# Patient Record
Sex: FEMALE | Race: WHITE | HISPANIC OR LATINO | Employment: PART TIME | URBAN - METROPOLITAN AREA
[De-identification: names, ages, dates, MRNs, and addresses within clinical notes are randomized per-mention and may not be internally consistent; named-entity substitution may affect disease eponyms.]

---

## 2017-01-19 ENCOUNTER — HOSPITAL ENCOUNTER (OUTPATIENT)
Dept: RADIOLOGY | Facility: MEDICAL CENTER | Age: 42
End: 2017-01-19
Attending: INTERNAL MEDICINE
Payer: COMMERCIAL

## 2017-01-19 DIAGNOSIS — M54.2 CERVICALGIA: ICD-10-CM

## 2017-01-19 DIAGNOSIS — M54.5 LOW BACK PAIN, UNSPECIFIED BACK PAIN LATERALITY, UNSPECIFIED CHRONICITY, WITH SCIATICA PRESENCE UNSPECIFIED: ICD-10-CM

## 2017-01-19 PROCEDURE — 72141 MRI NECK SPINE W/O DYE: CPT

## 2017-01-19 PROCEDURE — 72148 MRI LUMBAR SPINE W/O DYE: CPT

## 2017-03-16 ENCOUNTER — NON-PROVIDER VISIT (OUTPATIENT)
Dept: WOUND CARE | Facility: MEDICAL CENTER | Age: 42
End: 2017-03-16
Attending: NURSE PRACTITIONER
Payer: COMMERCIAL

## 2017-03-16 NOTE — WOUND TEAM
Pt arrived for scheduled evaluation appointment; however, referral is for physical therapy, not wound care.  Pt's referral to be forwarded to OP PT

## 2017-04-03 ENCOUNTER — HOSPITAL ENCOUNTER (OUTPATIENT)
Dept: PHYSICAL THERAPY | Facility: REHABILITATION | Age: 42
End: 2017-04-03
Attending: NURSE PRACTITIONER
Payer: COMMERCIAL

## 2017-04-03 PROCEDURE — 97014 ELECTRIC STIMULATION THERAPY: CPT

## 2017-04-03 PROCEDURE — 97163 PT EVAL HIGH COMPLEX 45 MIN: CPT

## 2017-04-06 ENCOUNTER — APPOINTMENT (OUTPATIENT)
Dept: PHYSICAL THERAPY | Facility: REHABILITATION | Age: 42
End: 2017-04-06
Attending: NURSE PRACTITIONER
Payer: COMMERCIAL

## 2017-04-10 ENCOUNTER — HOSPITAL ENCOUNTER (OUTPATIENT)
Dept: PHYSICAL THERAPY | Facility: REHABILITATION | Age: 42
End: 2017-04-10
Attending: NURSE PRACTITIONER
Payer: COMMERCIAL

## 2017-04-10 PROCEDURE — 97140 MANUAL THERAPY 1/> REGIONS: CPT

## 2017-04-10 PROCEDURE — 97012 MECHANICAL TRACTION THERAPY: CPT

## 2017-04-10 PROCEDURE — 97110 THERAPEUTIC EXERCISES: CPT

## 2017-04-11 ENCOUNTER — APPOINTMENT (OUTPATIENT)
Dept: PHYSICAL THERAPY | Facility: REHABILITATION | Age: 42
End: 2017-04-11
Attending: NURSE PRACTITIONER
Payer: COMMERCIAL

## 2017-04-13 ENCOUNTER — APPOINTMENT (OUTPATIENT)
Dept: PHYSICAL THERAPY | Facility: REHABILITATION | Age: 42
End: 2017-04-13
Attending: NURSE PRACTITIONER
Payer: COMMERCIAL

## 2017-04-18 ENCOUNTER — APPOINTMENT (OUTPATIENT)
Dept: PHYSICAL THERAPY | Facility: REHABILITATION | Age: 42
End: 2017-04-18
Attending: NURSE PRACTITIONER
Payer: COMMERCIAL

## 2017-04-20 ENCOUNTER — APPOINTMENT (OUTPATIENT)
Dept: PHYSICAL THERAPY | Facility: REHABILITATION | Age: 42
End: 2017-04-20
Attending: NURSE PRACTITIONER
Payer: COMMERCIAL

## 2017-04-24 ENCOUNTER — HOSPITAL ENCOUNTER (OUTPATIENT)
Dept: PHYSICAL THERAPY | Facility: REHABILITATION | Age: 42
End: 2017-04-24
Attending: NURSE PRACTITIONER
Payer: COMMERCIAL

## 2017-04-24 PROCEDURE — 97140 MANUAL THERAPY 1/> REGIONS: CPT

## 2017-04-24 PROCEDURE — 97012 MECHANICAL TRACTION THERAPY: CPT

## 2017-04-24 PROCEDURE — 97110 THERAPEUTIC EXERCISES: CPT

## 2017-04-27 ENCOUNTER — APPOINTMENT (OUTPATIENT)
Dept: PHYSICAL THERAPY | Facility: REHABILITATION | Age: 42
End: 2017-04-27
Attending: NURSE PRACTITIONER
Payer: COMMERCIAL

## 2017-05-01 ENCOUNTER — HOSPITAL ENCOUNTER (OUTPATIENT)
Dept: PHYSICAL THERAPY | Facility: REHABILITATION | Age: 42
End: 2017-05-01
Attending: NURSE PRACTITIONER
Payer: COMMERCIAL

## 2017-05-01 PROCEDURE — 97112 NEUROMUSCULAR REEDUCATION: CPT

## 2017-05-01 PROCEDURE — 97014 ELECTRIC STIMULATION THERAPY: CPT

## 2017-05-01 PROCEDURE — 97110 THERAPEUTIC EXERCISES: CPT

## 2017-05-03 ENCOUNTER — HOSPITAL ENCOUNTER (OUTPATIENT)
Dept: RADIOLOGY | Facility: MEDICAL CENTER | Age: 42
End: 2017-05-03
Attending: NEUROLOGICAL SURGERY | Admitting: NEUROLOGICAL SURGERY

## 2017-05-03 PROCEDURE — 71020 DX-CHEST-2 VIEWS: CPT

## 2017-05-03 RX ORDER — METHOCARBAMOL 750 MG/1
750 TABLET, FILM COATED ORAL 4 TIMES DAILY
Status: ON HOLD | COMMUNITY
End: 2017-05-11

## 2017-05-10 ENCOUNTER — APPOINTMENT (OUTPATIENT)
Dept: RADIOLOGY | Facility: MEDICAL CENTER | Age: 42
End: 2017-05-10
Attending: NEUROLOGICAL SURGERY
Payer: COMMERCIAL

## 2017-05-10 ENCOUNTER — HOSPITAL ENCOUNTER (OUTPATIENT)
Facility: MEDICAL CENTER | Age: 42
End: 2017-05-11
Attending: NEUROLOGICAL SURGERY | Admitting: NEUROLOGICAL SURGERY
Payer: COMMERCIAL

## 2017-05-10 PROBLEM — M47.12 CERVICAL SPONDYLOSIS WITH MYELOPATHY: Status: ACTIVE | Noted: 2017-05-10

## 2017-05-10 LAB
HCG UR QL: NEGATIVE
SP GR UR REFRACTOMETRY: 1.01

## 2017-05-10 PROCEDURE — 700102 HCHG RX REV CODE 250 W/ 637 OVERRIDE(OP): Performed by: NURSE PRACTITIONER

## 2017-05-10 PROCEDURE — 502000 HCHG MISC OR IMPLANTS RC 0278: Performed by: NEUROLOGICAL SURGERY

## 2017-05-10 PROCEDURE — 700102 HCHG RX REV CODE 250 W/ 637 OVERRIDE(OP)

## 2017-05-10 PROCEDURE — 96374 THER/PROPH/DIAG INJ IV PUSH: CPT

## 2017-05-10 PROCEDURE — A6402 STERILE GAUZE <= 16 SQ IN: HCPCS | Performed by: NEUROLOGICAL SURGERY

## 2017-05-10 PROCEDURE — 502240 HCHG MISC OR SUPPLY RC 0272: Performed by: NEUROLOGICAL SURGERY

## 2017-05-10 PROCEDURE — 700111 HCHG RX REV CODE 636 W/ 250 OVERRIDE (IP)

## 2017-05-10 PROCEDURE — 160009 HCHG ANES TIME/MIN: Performed by: NEUROLOGICAL SURGERY

## 2017-05-10 PROCEDURE — 72020 X-RAY EXAM OF SPINE 1 VIEW: CPT

## 2017-05-10 PROCEDURE — 700111 HCHG RX REV CODE 636 W/ 250 OVERRIDE (IP): Performed by: NURSE PRACTITIONER

## 2017-05-10 PROCEDURE — C1713 ANCHOR/SCREW BN/BN,TIS/BN: HCPCS | Performed by: NEUROLOGICAL SURGERY

## 2017-05-10 PROCEDURE — 160035 HCHG PACU - 1ST 60 MINS PHASE I: Performed by: NEUROLOGICAL SURGERY

## 2017-05-10 PROCEDURE — 81025 URINE PREGNANCY TEST: CPT

## 2017-05-10 PROCEDURE — 502626 HCHG SURGIFLO HEMOSTATIC MATRIX 6ML: Performed by: NEUROLOGICAL SURGERY

## 2017-05-10 PROCEDURE — G0378 HOSPITAL OBSERVATION PER HR: HCPCS

## 2017-05-10 PROCEDURE — 160036 HCHG PACU - EA ADDL 30 MINS PHASE I: Performed by: NEUROLOGICAL SURGERY

## 2017-05-10 PROCEDURE — 160029 HCHG SURGERY MINUTES - 1ST 30 MINS LEVEL 4: Performed by: NEUROLOGICAL SURGERY

## 2017-05-10 PROCEDURE — A9270 NON-COVERED ITEM OR SERVICE: HCPCS

## 2017-05-10 PROCEDURE — 700101 HCHG RX REV CODE 250

## 2017-05-10 PROCEDURE — 500367 HCHG DRAIN KIT, HEMOVAC: Performed by: NEUROLOGICAL SURGERY

## 2017-05-10 PROCEDURE — 501838 HCHG SUTURE GENERAL: Performed by: NEUROLOGICAL SURGERY

## 2017-05-10 PROCEDURE — A9270 NON-COVERED ITEM OR SERVICE: HCPCS | Performed by: NURSE PRACTITIONER

## 2017-05-10 PROCEDURE — 160048 HCHG OR STATISTICAL LEVEL 1-5: Performed by: NEUROLOGICAL SURGERY

## 2017-05-10 PROCEDURE — 500122 HCHG BOVIE, BLADE: Performed by: NEUROLOGICAL SURGERY

## 2017-05-10 PROCEDURE — 160002 HCHG RECOVERY MINUTES (STAT): Performed by: NEUROLOGICAL SURGERY

## 2017-05-10 PROCEDURE — 700112 HCHG RX REV CODE 229: Performed by: NURSE PRACTITIONER

## 2017-05-10 PROCEDURE — 700101 HCHG RX REV CODE 250: Performed by: NURSE PRACTITIONER

## 2017-05-10 PROCEDURE — 500002 HCHG ADHESIVE, DERMABOND: Performed by: NEUROLOGICAL SURGERY

## 2017-05-10 PROCEDURE — 160041 HCHG SURGERY MINUTES - EA ADDL 1 MIN LEVEL 4: Performed by: NEUROLOGICAL SURGERY

## 2017-05-10 PROCEDURE — 500864 HCHG NEEDLE, SPINAL 18G: Performed by: NEUROLOGICAL SURGERY

## 2017-05-10 DEVICE — PLATE ANTERIOR CERVICAL 40MM (1TX1+2TCX1=3): Type: IMPLANTABLE DEVICE | Status: FUNCTIONAL

## 2017-05-10 DEVICE — DBM PUTTY PROGENIX 1.0CC: Type: IMPLANTABLE DEVICE | Status: FUNCTIONAL

## 2017-05-10 DEVICE — IMPLANTABLE DEVICE: Type: IMPLANTABLE DEVICE | Status: FUNCTIONAL

## 2017-05-10 DEVICE — SCREW 4.0X14 SELF DRILL VAR (1TX12+2TCX12=36): Type: IMPLANTABLE DEVICE | Status: FUNCTIONAL

## 2017-05-10 RX ORDER — CYCLOBENZAPRINE HCL 10 MG
10 TABLET ORAL EVERY 8 HOURS PRN
Status: DISCONTINUED | OUTPATIENT
Start: 2017-05-10 | End: 2017-05-11 | Stop reason: HOSPADM

## 2017-05-10 RX ORDER — SODIUM CHLORIDE AND POTASSIUM CHLORIDE 150; 900 MG/100ML; MG/100ML
INJECTION, SOLUTION INTRAVENOUS CONTINUOUS
Status: DISCONTINUED | OUTPATIENT
Start: 2017-05-10 | End: 2017-05-11 | Stop reason: HOSPADM

## 2017-05-10 RX ORDER — MORPHINE SULFATE 4 MG/ML
4 INJECTION, SOLUTION INTRAMUSCULAR; INTRAVENOUS
Status: DISCONTINUED | OUTPATIENT
Start: 2017-05-10 | End: 2017-05-11 | Stop reason: HOSPADM

## 2017-05-10 RX ORDER — CEFAZOLIN SODIUM 2 G/100ML
2 INJECTION, SOLUTION INTRAVENOUS EVERY 8 HOURS
Status: COMPLETED | OUTPATIENT
Start: 2017-05-10 | End: 2017-05-11

## 2017-05-10 RX ORDER — OXYCODONE HCL 20 MG/1
TABLET, FILM COATED, EXTENDED RELEASE ORAL
Status: COMPLETED
Start: 2017-05-10 | End: 2017-05-10

## 2017-05-10 RX ORDER — DIAZEPAM 5 MG/ML
5 INJECTION, SOLUTION INTRAMUSCULAR; INTRAVENOUS EVERY 4 HOURS PRN
Status: DISCONTINUED | OUTPATIENT
Start: 2017-05-10 | End: 2017-05-11 | Stop reason: HOSPADM

## 2017-05-10 RX ORDER — LABETALOL HYDROCHLORIDE 5 MG/ML
10 INJECTION, SOLUTION INTRAVENOUS
Status: DISCONTINUED | OUTPATIENT
Start: 2017-05-10 | End: 2017-05-11 | Stop reason: HOSPADM

## 2017-05-10 RX ORDER — GABAPENTIN 300 MG/1
CAPSULE ORAL
Status: COMPLETED
Start: 2017-05-10 | End: 2017-05-10

## 2017-05-10 RX ORDER — ACETAMINOPHEN 325 MG/1
650 TABLET ORAL EVERY 6 HOURS PRN
COMMUNITY

## 2017-05-10 RX ORDER — SODIUM CHLORIDE, SODIUM LACTATE, POTASSIUM CHLORIDE, AND CALCIUM CHLORIDE .6; .31; .03; .02 G/100ML; G/100ML; G/100ML; G/100ML
IRRIGANT IRRIGATION
Status: DISCONTINUED | OUTPATIENT
Start: 2017-05-10 | End: 2017-05-10 | Stop reason: HOSPADM

## 2017-05-10 RX ORDER — BISACODYL 10 MG
10 SUPPOSITORY, RECTAL RECTAL
Status: DISCONTINUED | OUTPATIENT
Start: 2017-05-10 | End: 2017-05-11 | Stop reason: HOSPADM

## 2017-05-10 RX ORDER — AMOXICILLIN 250 MG
1 CAPSULE ORAL NIGHTLY
Status: DISCONTINUED | OUTPATIENT
Start: 2017-05-10 | End: 2017-05-11 | Stop reason: HOSPADM

## 2017-05-10 RX ORDER — DOCUSATE SODIUM 100 MG/1
100 CAPSULE, LIQUID FILLED ORAL 2 TIMES DAILY
Status: DISCONTINUED | OUTPATIENT
Start: 2017-05-10 | End: 2017-05-11 | Stop reason: HOSPADM

## 2017-05-10 RX ORDER — POLYETHYLENE GLYCOL 3350 17 G/17G
1 POWDER, FOR SOLUTION ORAL 2 TIMES DAILY PRN
Status: DISCONTINUED | OUTPATIENT
Start: 2017-05-10 | End: 2017-05-11 | Stop reason: HOSPADM

## 2017-05-10 RX ORDER — SODIUM CHLORIDE AND POTASSIUM CHLORIDE 150; 900 MG/100ML; MG/100ML
INJECTION, SOLUTION INTRAVENOUS
Status: COMPLETED
Start: 2017-05-10 | End: 2017-05-10

## 2017-05-10 RX ORDER — METHOCARBAMOL 750 MG/1
750 TABLET, FILM COATED ORAL EVERY 8 HOURS PRN
Status: DISCONTINUED | OUTPATIENT
Start: 2017-05-10 | End: 2017-05-11 | Stop reason: HOSPADM

## 2017-05-10 RX ORDER — HYDROMORPHONE HYDROCHLORIDE 2 MG/ML
INJECTION, SOLUTION INTRAMUSCULAR; INTRAVENOUS; SUBCUTANEOUS
Status: COMPLETED
Start: 2017-05-10 | End: 2017-05-10

## 2017-05-10 RX ORDER — OXYCODONE HYDROCHLORIDE 10 MG/1
10 TABLET ORAL
Status: DISCONTINUED | OUTPATIENT
Start: 2017-05-10 | End: 2017-05-11 | Stop reason: HOSPADM

## 2017-05-10 RX ORDER — CLONIDINE HYDROCHLORIDE 0.1 MG/1
0.1 TABLET ORAL EVERY 4 HOURS PRN
Status: DISCONTINUED | OUTPATIENT
Start: 2017-05-10 | End: 2017-05-11 | Stop reason: HOSPADM

## 2017-05-10 RX ORDER — PROMETHAZINE HYDROCHLORIDE 25 MG/1
12.5-25 SUPPOSITORY RECTAL EVERY 4 HOURS PRN
Status: DISCONTINUED | OUTPATIENT
Start: 2017-05-10 | End: 2017-05-11 | Stop reason: HOSPADM

## 2017-05-10 RX ORDER — DIAZEPAM 2 MG/1
5 TABLET ORAL EVERY 4 HOURS PRN
Status: DISCONTINUED | OUTPATIENT
Start: 2017-05-10 | End: 2017-05-11 | Stop reason: HOSPADM

## 2017-05-10 RX ORDER — SODIUM CHLORIDE, SODIUM LACTATE, POTASSIUM CHLORIDE, CALCIUM CHLORIDE 600; 310; 30; 20 MG/100ML; MG/100ML; MG/100ML; MG/100ML
1000 INJECTION, SOLUTION INTRAVENOUS
Status: COMPLETED | OUTPATIENT
Start: 2017-05-10 | End: 2017-05-10

## 2017-05-10 RX ORDER — HYDRALAZINE HYDROCHLORIDE 20 MG/ML
10 INJECTION INTRAMUSCULAR; INTRAVENOUS
Status: DISCONTINUED | OUTPATIENT
Start: 2017-05-10 | End: 2017-05-11 | Stop reason: HOSPADM

## 2017-05-10 RX ORDER — PROMETHAZINE HYDROCHLORIDE 25 MG/1
12.5-25 TABLET ORAL EVERY 4 HOURS PRN
Status: DISCONTINUED | OUTPATIENT
Start: 2017-05-10 | End: 2017-05-11 | Stop reason: HOSPADM

## 2017-05-10 RX ORDER — ACETAMINOPHEN 500 MG
TABLET ORAL
Status: COMPLETED
Start: 2017-05-10 | End: 2017-05-10

## 2017-05-10 RX ORDER — ONDANSETRON 2 MG/ML
4 INJECTION INTRAMUSCULAR; INTRAVENOUS EVERY 4 HOURS PRN
Status: DISCONTINUED | OUTPATIENT
Start: 2017-05-10 | End: 2017-05-11 | Stop reason: HOSPADM

## 2017-05-10 RX ORDER — ONDANSETRON 4 MG/1
4 TABLET, ORALLY DISINTEGRATING ORAL EVERY 4 HOURS PRN
Status: DISCONTINUED | OUTPATIENT
Start: 2017-05-10 | End: 2017-05-11 | Stop reason: HOSPADM

## 2017-05-10 RX ORDER — BACITRACIN 50000 [IU]/1
INJECTION, POWDER, FOR SOLUTION INTRAMUSCULAR
Status: DISCONTINUED | OUTPATIENT
Start: 2017-05-10 | End: 2017-05-10 | Stop reason: HOSPADM

## 2017-05-10 RX ORDER — ENEMA 19; 7 G/133ML; G/133ML
1 ENEMA RECTAL
Status: DISCONTINUED | OUTPATIENT
Start: 2017-05-10 | End: 2017-05-11 | Stop reason: HOSPADM

## 2017-05-10 RX ORDER — BUPIVACAINE HYDROCHLORIDE AND EPINEPHRINE 5; 5 MG/ML; UG/ML
INJECTION, SOLUTION EPIDURAL; INTRACAUDAL; PERINEURAL
Status: DISCONTINUED | OUTPATIENT
Start: 2017-05-10 | End: 2017-05-10 | Stop reason: HOSPADM

## 2017-05-10 RX ORDER — OXYCODONE HYDROCHLORIDE 10 MG/1
5 TABLET ORAL
Status: DISCONTINUED | OUTPATIENT
Start: 2017-05-10 | End: 2017-05-11 | Stop reason: HOSPADM

## 2017-05-10 RX ORDER — AMOXICILLIN 250 MG
1 CAPSULE ORAL
Status: DISCONTINUED | OUTPATIENT
Start: 2017-05-10 | End: 2017-05-11 | Stop reason: HOSPADM

## 2017-05-10 RX ORDER — DIPHENHYDRAMINE HYDROCHLORIDE 50 MG/ML
25 INJECTION INTRAMUSCULAR; INTRAVENOUS EVERY 6 HOURS PRN
Status: DISCONTINUED | OUTPATIENT
Start: 2017-05-10 | End: 2017-05-11 | Stop reason: HOSPADM

## 2017-05-10 RX ORDER — DIPHENHYDRAMINE HCL 25 MG
25 TABLET ORAL EVERY 6 HOURS PRN
Status: DISCONTINUED | OUTPATIENT
Start: 2017-05-10 | End: 2017-05-11 | Stop reason: HOSPADM

## 2017-05-10 RX ORDER — MIDAZOLAM HYDROCHLORIDE 1 MG/ML
INJECTION INTRAMUSCULAR; INTRAVENOUS
Status: DISPENSED
Start: 2017-05-10 | End: 2017-05-11

## 2017-05-10 RX ORDER — ACETAMINOPHEN 500 MG
1000 TABLET ORAL EVERY 6 HOURS
Status: DISCONTINUED | OUTPATIENT
Start: 2017-05-10 | End: 2017-05-11 | Stop reason: HOSPADM

## 2017-05-10 RX ADMIN — POTASSIUM CHLORIDE AND SODIUM CHLORIDE: 900; 150 INJECTION, SOLUTION INTRAVENOUS at 18:45

## 2017-05-10 RX ADMIN — STANDARDIZED SENNA CONCENTRATE AND DOCUSATE SODIUM 1 TABLET: 8.6; 5 TABLET, FILM COATED ORAL at 20:07

## 2017-05-10 RX ADMIN — CEFAZOLIN SODIUM 2 G: 2 INJECTION, SOLUTION INTRAVENOUS at 20:30

## 2017-05-10 RX ADMIN — FENTANYL CITRATE 25 MCG: 50 INJECTION, SOLUTION INTRAMUSCULAR; INTRAVENOUS at 17:40

## 2017-05-10 RX ADMIN — HYDROMORPHONE HYDROCHLORIDE 0.5 MG: 2 INJECTION INTRAMUSCULAR; INTRAVENOUS; SUBCUTANEOUS at 17:59

## 2017-05-10 RX ADMIN — ONDANSETRON 4 MG: 2 INJECTION INTRAMUSCULAR; INTRAVENOUS at 20:30

## 2017-05-10 RX ADMIN — OXYCODONE HYDROCHLORIDE 10 MG: 10 TABLET ORAL at 20:04

## 2017-05-10 RX ADMIN — OXYCODONE HYDROCHLORIDE 20 MG: 20 TABLET, FILM COATED, EXTENDED RELEASE ORAL at 14:53

## 2017-05-10 RX ADMIN — POTASSIUM CHLORIDE AND SODIUM CHLORIDE: 900; 150 INJECTION, SOLUTION INTRAVENOUS at 20:07

## 2017-05-10 RX ADMIN — DOCUSATE SODIUM 100 MG: 100 CAPSULE ORAL at 20:06

## 2017-05-10 RX ADMIN — ACETAMINOPHEN 1000 MG: 500 TABLET, FILM COATED ORAL at 14:53

## 2017-05-10 RX ADMIN — FENTANYL CITRATE 25 MCG: 50 INJECTION, SOLUTION INTRAMUSCULAR; INTRAVENOUS at 18:09

## 2017-05-10 RX ADMIN — FENTANYL CITRATE 25 MCG: 50 INJECTION, SOLUTION INTRAMUSCULAR; INTRAVENOUS at 18:26

## 2017-05-10 RX ADMIN — HYDROMORPHONE HYDROCHLORIDE 0.5 MG: 2 INJECTION INTRAMUSCULAR; INTRAVENOUS; SUBCUTANEOUS at 17:44

## 2017-05-10 RX ADMIN — GABAPENTIN 600 MG: 300 CAPSULE ORAL at 14:53

## 2017-05-10 RX ADMIN — ACETAMINOPHEN 1000 MG: 500 TABLET, FILM COATED ORAL at 20:06

## 2017-05-10 RX ADMIN — SODIUM CHLORIDE, SODIUM LACTATE, POTASSIUM CHLORIDE, CALCIUM CHLORIDE 1000 ML: 600; 310; 30; 20 INJECTION, SOLUTION INTRAVENOUS at 13:39

## 2017-05-10 RX ADMIN — FENTANYL CITRATE 25 MCG: 50 INJECTION, SOLUTION INTRAMUSCULAR; INTRAVENOUS at 17:49

## 2017-05-10 ASSESSMENT — PAIN SCALES - GENERAL
PAINLEVEL_OUTOF10: 0
PAINLEVEL_OUTOF10: 5
PAINLEVEL_OUTOF10: 5
PAINLEVEL_OUTOF10: 3
PAINLEVEL_OUTOF10: 4

## 2017-05-10 NOTE — IP AVS SNAPSHOT
ClarityAd Access Code: [email or web address]  Expires: 6/10/2017  2:19 PM    Your email address is not on file at [facility].  Email Addresses are required for you to sign up for ClarityAd, please contact [phone] to verify your personal information and to provide your email address prior to attempting to register for ClarityAd.    Ronit JhaverirezGarrySommer  [address] [place] [address]    ClarityAd  A secure, online tool to manage your health information     [facility]’s ClarityAd® is a secure, online tool that connects you to your personalized health information from the privacy of your home -- day or night - making it very easy for you to manage your healthcare. Once the activation process is completed, you can even access your medical information using the ClarityAd rachael, which is available for free in the Apple Rachael store or Google Play store.     To learn more about ClarityAd, visit [email or web address]    There are two levels of access available (as shown below):   My Chart Features  [facility] Primary Care Doctor [facility]  Specialists [facility]  Urgent  Care Non-[facility] Primary Care Doctor   Email your healthcare team securely and privately 24/7 X X X    Manage appointments: schedule your next appointment; view details of past/upcoming appointments X      Request prescription refills. X      View recent personal medical records, including lab and immunizations X X X X   View health record, including health history, allergies, medications X X X X   Read reports about your outpatient visits, procedures, consult and ER notes X X X X   See your discharge summary, which is a recap of your hospital and/or ER visit that includes your diagnosis, lab results, and care plan X X  X     How to register for [employer]:  Once your e-mail address has been verified, follow the following steps to sign up for [employer].     1. Go to  [email or web address]  2. Click on the Sign Up Now box, which takes you to the New Member Sign Up  page. You will need to provide the following information:  a. Enter your [employer] Access Code exactly as it appears at the top of this page. (You will not need to use this code after you’ve completed the sign-up process. If you do not sign up before the expiration date, you must request a new code.)   b. Enter your date of birth.   c. Enter your home email address.   d. Click Submit, and follow the next screen’s instructions.  3. Create a IdleAir ID. This will be your IdleAir login ID and cannot be changed, so think of one that is secure and easy to remember.  4. Create a IdleAir password. You can change your password at any time.  5. Enter your Password Reset Question and Answer. This can be used at a later time if you forget your password.   6. Enter your e-mail address. This allows you to receive e-mail notifications when new information is available in IdleAir.  7. Click Sign Up. You can now view your health information.    For assistance activating your [employer] account, call [phone]

## 2017-05-10 NOTE — IP AVS SNAPSHOT
" Home Care Instructions                                                                                                                  Name:Ronit Garg  Medical Record Number:[id number]  CSN: [id number]    YOB: 1975   Age: 41 y.o.  Sex: female  HT:1.651 m (5' 5\") WT: 92.7 kg (204 lb 5.9 oz)          Admit Date: 5/10/2017     Discharge Date:   Today's Date: 5/11/2017  Attending Doctor:  JOSE DANIEL Gordon*                  Allergies:  Nkda            Discharge Instructions       Discharge Instructions    Discharged to home by car with relative. Discharged via wheelchair, hospital escort: Yes.  Special equipment needed: C-Collar    Be sure to schedule a follow-up appointment with your primary care doctor or any specialists as instructed.     Discharge Plan:   Diet Plan: Discussed  Activity Level: Discussed  Confirmed Follow up Appointment: Patient to Call and Schedule Appointment  Confirmed Symptoms Management: Discussed  Medication Reconciliation Updated: Yes  Influenza Vaccine Indication: Not indicated: Previously immunized this influenza season and > 8 years of age    I understand that a diet low in cholesterol, fat, and sodium is recommended for good health. Unless I have been given specific instructions below for another diet, I accept this instruction as my diet prescription.   Other diet: Regular    Special Instructions: Follow up with [facility] at [facility] in 2 weeks [phone]   Follow up with Dr. Hoffman in 6 weeks   No pushing, pulling, lifting greater than 10 pounds   No repetitive motion above shoulder level   Ok to shower, pat incision dry - 24 hours after drain was removed   No non-steroidal anti-inflammatory medications or aspirin until cleared by Dr. Hoffman   Ambulate as much is comfortable   No driving for at least 2 weeks following surgery or until cleared   Obtain over the counter senekot take 1-2 tablets daily while taking narcotic pain medication   Do " not return to work until cleared by physician   Wear brace as needed for comfort    · Is patient discharged on Warfarin / Coumadin?   No     · Is patient Post Blood Transfusion?  No    Incision Care  An incision is when a surgeon cuts into your body. After surgery, the incision needs to be cared for properly to prevent infection.   HOW TO CARE FOR YOUR INCISION  · Take medicines only as directed by your health care provider.  · There are many different ways to close and cover an incision, including stitches, skin glue, and adhesive strips. Follow your health care provider's instructions on:  ¨ Incision care.  ¨ Bandage (dressing) changes and removal.  ¨ Incision closure removal.  · Do not take baths, swim, or use a hot tub until your health care provider approves. You may shower as directed by your health care provider.  · Resume your normal diet and activities as directed.  · Use anti-itch medicine (such as an antihistamine) as directed by your health care provider. The incision may itch while it is healing. Do not pick or scratch at the incision.  · Drink enough fluid to keep your urine clear or pale yellow.  SEEK MEDICAL CARE IF:   · You have drainage, redness, swelling, or pain at your incision site.  · You have muscle aches, chills, or a general ill feeling.  · You notice a bad smell coming from the incision or dressing.  · Your incision edges separate after the sutures, staples, or skin adhesive strips have been removed.  · You have persistent nausea or vomiting.  · You have a fever.  · You are dizzy.  SEEK IMMEDIATE MEDICAL CARE IF:   · You have a rash.  · You faint.  · You have difficulty breathing.  MAKE SURE YOU:   · Understand these instructions.  · Will watch your condition.  · Will get help right away if you are not doing well or get worse.     This information is not intended to replace advice given to you by your health care provider. Make sure you discuss any questions you have with your health care  provider.     Document Released: 07/07/2006 Document Revised: 01/08/2016 Document Reviewed: 02/11/2015  [employer] Interactive Patient Education ©2016 [employer]          Depression / Suicide Risk    As you are discharged from this [facility] facility, it is important to learn how to keep safe from harming yourself.    Recognize the warning signs:  · Abrupt changes in personality, positive or negative- including increase in energy   · Giving away possessions  · Change in eating patterns- significant weight changes-  positive or negative  · Change in sleeping patterns- unable to sleep or sleeping all the time   · Unwillingness or inability to communicate  · Depression  · Unusual sadness, discouragement and loneliness  · Talk of wanting to die  · Neglect of personal appearance   · Rebelliousness- reckless behavior  · Withdrawal from people/activities they love  · Confusion- inability to concentrate     If you or a loved one observes any of these behaviors or has concerns about self-harm, here's what you can do:  · Talk about it- your feelings and reasons for harming yourself  · Remove any means that you might use to hurt yourself (examples: pills, rope, extension cords, firearm)  · Get professional help from the community (Mental Health, Substance Abuse, psychological counseling)  · Do not be alone:Call your Safe Contact- someone whom you trust who will be there for you.  · Call your local CRISIS HOTLINE 998-0729 or 798-187-5850  · Call your local Children's Mobile Crisis Response Team [place] (692) 368-6647 or www.Kaminario  · Call the toll free National Suicide Prevention Hotlines   · National Suicide Prevention Lifeline 878-726-TVAG (6933)  · National Hope Line Network 800-SUICIDE (580-2853)            Follow-up Information     1. Follow up with Bossman Hoffman M.D.. Schedule an appointment as soon as possible for a visit in 2 weeks.    Specialty:  Neurosurgery    Contact information    [address]  [address]  [place] [address]  [phone]           Discharge Medication Instructions:    Below are the medications your physician expects you to take upon discharge:    Review all your home medications and newly ordered medications with your doctor and/or pharmacist. Follow medication instructions as directed by your doctor and/or pharmacist.    Please keep your medication list with you and share with your physician.               Medication List      START taking these medications        Instructions    Morning Afternoon Evening Bedtime     MG Caps   Last time this was given:  100 mg on 5/11/2017 10:13 AM        Take 100 mg by mouth 2 times a day.   Dose:  100 mg                        hydrocodone-acetaminophen 5-325 MG Tabs per tablet   Commonly known as:  NORCO        Take 1-2 Tabs by mouth every 6 hours as needed.   Dose:  1-2 Tab                          CHANGE how you take these medications        Instructions    Morning Afternoon Evening Bedtime    methocarbamol 750 MG Tabs   What changed:    - when to take this  - reasons to take this   Commonly known as:  ROBAXIN        Take 1 Tab by mouth 3 times a day as needed (spasms).   Dose:  750 mg                          CONTINUE taking these medications        Instructions    Morning Afternoon Evening Bedtime    acetaminophen 325 MG Tabs   Last time this was given:  1,000 mg on 5/11/2017 11:51 AM   Commonly known as:  TYLENOL        Take 650 mg by mouth Once.   Dose:  650 mg                             Where to Get Your Medications      Information about where to get these medications is not yet available     ! Ask your nurse or doctor about these medications    - hydrocodone-acetaminophen 5-325 MG Tabs per tablet  - methocarbamol 750 MG Tabs            Instructions           Diet / Nutrition:    Follow any diet instructions given to you by your doctor or the dietician, including how much salt (sodium) you are allowed each day.    If you are overweight,  talk to your doctor about a weight reduction plan.    Activity:    Remain physically active following your doctor's instructions about exercise and activity.    Rest often.     Any time you become even a little tired or short of breath, SIT DOWN and rest.    Worsening Symptoms:    Report any of the following signs and symptoms to the doctor's office immediately:    *Pain of jaw, arm, or neck  *Chest pain not relieved by medication                               *Dizziness or loss of consciousness  *Difficulty breathing even when at rest   *More tired than usual                                       *Bleeding drainage or swelling of surgical site  *Swelling of feet, ankles, legs or stomach                 *Fever (>100ºF)  *Pink or blood tinged sputum  *Weight gain (3lbs/day or 5lbs /week)           *Shock from internal defibrillator (if applicable)  *Palpitations or irregular heartbeats                *Cool and/or numb extremities    Stroke Awareness    Common Risk Factors for Stroke include:    Age  Atrial Fibrillation  Carotid Artery Stenosis  Diabetes Mellitus  Excessive alcohol consumption  High blood pressure  Overweight   Physical inactivity  Smoking    Warning signs and symptoms of a stroke include:    *Sudden numbness or weakness of the face, arm or leg (especially on one side of the body).  *Sudden confusion, trouble speaking or understanding.  *Sudden trouble seeing in one or both eyes.  *Sudden trouble walking, dizziness, loss of balance or coordination.Sudden severe headache with no known cause.    It is very important to get treatment quickly when a stroke occurs. If you experience any of the above warning signs, call 911 immediately.                   Disclaimer         Quit Smoking / Tobacco Use:    I understand the use of any tobacco products increases my chance of suffering from future heart disease or stroke and could cause other illnesses which may shorten my life. Quitting the use of tobacco  products is the single most important thing I can do to improve my health. For further information on smoking / tobacco cessation call a Toll Free Quit Line at 1-146.668.7757 (*National Cancer [place]) or 1-759.874.9136 (American Lung Association) or you can access the web based program at www.lungusa.org.    [place] Tobacco Users Help Line:  (624) 275-3400       Toll Free: 1-861.536.7421  Quit Tobacco Program [facility] (875)365-9438    Crisis Hotline:    [place] Crisis Hotline:  3-702-BLXYFLJ or 1-751.698.7641    [place] Crisis Hotline:    1-425.117.1165 or 819-175-6573    Discharge Survey:   Thank you for choosing [facility]. We hope we did everything we could to make your hospital stay a pleasant one. You may be receiving a phone survey and we would appreciate your time and participation in answering the questions. Your input is very valuable to us in our efforts to improve our service to our patients and their families.        My signature on this form indicates that:    1. I have reviewed and understand the above information.  2. My questions regarding this information have been answered to my satisfaction.  3. I have formulated a plan with my discharge nurse to obtain my prescribed medications for home.                  Disclaimer         __________________________________                     __________       ________                       Patient Signature                                                 Date                    Time

## 2017-05-10 NOTE — IP AVS SNAPSHOT
5/11/2017    Ronit Sabine Britany  [address] [place] [id number]    Dear Ronit:    [facility] wants to ensure your discharge home is safe and you or your loved ones have had all of your questions answered regarding your care after you leave the hospital.    Below is a list of resources and contact information should you have any questions regarding your hospital stay, follow-up instructions, or active medical symptoms.    Questions or Concerns Regarding… Contact   Medical Questions Related to Your Discharge  (7 days a week, 8am-5pm) Contact a Nurse Care Coordinator   [phone]   Medical Questions Not Related to Your Discharge  (24 hours a day / 7 days a week)  Contact the Nurse Health Line   [phone]    Medications or Discharge Instructions Refer to your discharge packet   or contact your [facility] Primary Care Provider   [phone]   Follow-up Appointment(s) Schedule your appointment via [employer]   or contact Scheduling [phone]   Billing Review your statement via [employer]  or contact Billing [phone]   Medical Records Review your records via [employer]   or contact Medical Records [phone]     You may receive a telephone call within two days of discharge. This call is to make certain you understand your discharge instructions and have the opportunity to have any questions answered. You can also easily access your medical information, test results and upcoming appointments via the [employer] free online health management tool. You can learn more and sign up at [employer]. For assistance setting up your [employer] account, please call [phone].    Once again, we want to ensure your discharge home is safe and that you have a clear understanding of any next steps in your care. If you have any questions or concerns, please do not hesitate to contact us, we are here for you. Thank you for choosing [facility] for your healthcare needs.    Sincerely,    Your [facility] Healthcare Team

## 2017-05-10 NOTE — IP AVS SNAPSHOT
" <p align=\"LEFT\"><IMG SRC=\"//[email or web address]/blob$/[email or web address]\" alt=\"Image\" WIDTH=\"50%\" HEIGHT=\"200\" BORDER=\"\"></p>                   Name:Ronit Garg  Medical Record Number:[id number]  CSN: [id number]    YOB: 1975   Age: 41 y.o.  Sex: female  HT:1.651 m (5' 5\") WT: 92.7 kg (204 lb 5.9 oz)          Admit Date: 5/10/2017     Discharge Date:   Today's Date: 5/11/2017  Attending Doctor:  JOSE DANIEL Gordon*                  Allergies:  Nkda          Follow-up Information     1. Follow up with Bossman Hoffman M.D.. Schedule an appointment as soon as possible for a visit in 2 weeks.    Specialty:  Neurosurgery    Contact information    [address] [address] [phone]           Medication List      Take these Medications        Instructions    acetaminophen 325 MG Tabs   Commonly known as:  TYLENOL    Take 650 mg by mouth Once.   Dose:  650 mg        MG Caps    Take 100 mg by mouth 2 times a day.   Dose:  100 mg       hydrocodone-acetaminophen 5-325 MG Tabs per tablet   Commonly known as:  NORCO    Take 1-2 Tabs by mouth every 6 hours as needed.   Dose:  1-2 Tab       methocarbamol 750 MG Tabs   What changed:    - when to take this  - reasons to take this   Commonly known as:  ROBAXIN    Take 1 Tab by mouth 3 times a day as needed (spasms).   Dose:  750 mg         "

## 2017-05-11 VITALS
SYSTOLIC BLOOD PRESSURE: 142 MMHG | OXYGEN SATURATION: 98 % | HEART RATE: 65 BPM | BODY MASS INDEX: 34.05 KG/M2 | RESPIRATION RATE: 12 BRPM | WEIGHT: 204.37 LBS | HEIGHT: 65 IN | TEMPERATURE: 98.2 F | DIASTOLIC BLOOD PRESSURE: 72 MMHG

## 2017-05-11 LAB
ANION GAP SERPL CALC-SCNC: 9 MMOL/L (ref 0–11.9)
BASOPHILS # BLD AUTO: 0.3 % (ref 0–1.8)
BASOPHILS # BLD: 0.05 K/UL (ref 0–0.12)
BUN SERPL-MCNC: 11 MG/DL (ref 8–22)
CALCIUM SERPL-MCNC: 9.2 MG/DL (ref 8.5–10.5)
CHLORIDE SERPL-SCNC: 104 MMOL/L (ref 96–112)
CO2 SERPL-SCNC: 26 MMOL/L (ref 20–33)
CREAT SERPL-MCNC: 0.92 MG/DL (ref 0.5–1.4)
EKG IMPRESSION: NORMAL
EOSINOPHIL # BLD AUTO: 0.01 K/UL (ref 0–0.51)
EOSINOPHIL NFR BLD: 0.1 % (ref 0–6.9)
ERYTHROCYTE [DISTWIDTH] IN BLOOD BY AUTOMATED COUNT: 46 FL (ref 35.9–50)
GFR SERPL CREATININE-BSD FRML MDRD: >60 ML/MIN/1.73 M 2
GLUCOSE SERPL-MCNC: 106 MG/DL (ref 65–99)
HCT VFR BLD AUTO: 40.9 % (ref 37–47)
HGB BLD-MCNC: 13.5 G/DL (ref 12–16)
IMM GRANULOCYTES # BLD AUTO: 0.16 K/UL (ref 0–0.11)
IMM GRANULOCYTES NFR BLD AUTO: 1.1 % (ref 0–0.9)
LYMPHOCYTES # BLD AUTO: 3.28 K/UL (ref 1–4.8)
LYMPHOCYTES NFR BLD: 21.9 % (ref 22–41)
MCH RBC QN AUTO: 31.7 PG (ref 27–33)
MCHC RBC AUTO-ENTMCNC: 33 G/DL (ref 33.6–35)
MCV RBC AUTO: 96 FL (ref 81.4–97.8)
MONOCYTES # BLD AUTO: 1.2 K/UL (ref 0–0.85)
MONOCYTES NFR BLD AUTO: 8 % (ref 0–13.4)
NEUTROPHILS # BLD AUTO: 10.26 K/UL (ref 2–7.15)
NEUTROPHILS NFR BLD: 68.6 % (ref 44–72)
NRBC # BLD AUTO: 0 K/UL
NRBC BLD AUTO-RTO: 0 /100 WBC
PLATELET # BLD AUTO: 328 K/UL (ref 164–446)
PMV BLD AUTO: 10.8 FL (ref 9–12.9)
POTASSIUM SERPL-SCNC: 4.1 MMOL/L (ref 3.6–5.5)
RBC # BLD AUTO: 4.26 M/UL (ref 4.2–5.4)
SODIUM SERPL-SCNC: 139 MMOL/L (ref 135–145)
TROPONIN I SERPL-MCNC: <0.01 NG/ML (ref 0–0.04)
WBC # BLD AUTO: 15 K/UL (ref 4.8–10.8)

## 2017-05-11 PROCEDURE — G8987 SELF CARE CURRENT STATUS: HCPCS | Mod: CI

## 2017-05-11 PROCEDURE — 700102 HCHG RX REV CODE 250 W/ 637 OVERRIDE(OP): Performed by: NURSE PRACTITIONER

## 2017-05-11 PROCEDURE — 700111 HCHG RX REV CODE 636 W/ 250 OVERRIDE (IP): Performed by: NURSE PRACTITIONER

## 2017-05-11 PROCEDURE — G0378 HOSPITAL OBSERVATION PER HR: HCPCS

## 2017-05-11 PROCEDURE — 80048 BASIC METABOLIC PNL TOTAL CA: CPT

## 2017-05-11 PROCEDURE — 93005 ELECTROCARDIOGRAM TRACING: CPT | Performed by: NEUROLOGICAL SURGERY

## 2017-05-11 PROCEDURE — G8988 SELF CARE GOAL STATUS: HCPCS | Mod: CI

## 2017-05-11 PROCEDURE — 84484 ASSAY OF TROPONIN QUANT: CPT

## 2017-05-11 PROCEDURE — A9270 NON-COVERED ITEM OR SERVICE: HCPCS | Performed by: NURSE PRACTITIONER

## 2017-05-11 PROCEDURE — G8989 SELF CARE D/C STATUS: HCPCS | Mod: CI

## 2017-05-11 PROCEDURE — G8980 MOBILITY D/C STATUS: HCPCS | Mod: CI

## 2017-05-11 PROCEDURE — 97165 OT EVAL LOW COMPLEX 30 MIN: CPT

## 2017-05-11 PROCEDURE — G8979 MOBILITY GOAL STATUS: HCPCS | Mod: CI

## 2017-05-11 PROCEDURE — 700112 HCHG RX REV CODE 229: Performed by: NURSE PRACTITIONER

## 2017-05-11 PROCEDURE — 96376 TX/PRO/DX INJ SAME DRUG ADON: CPT

## 2017-05-11 PROCEDURE — G8978 MOBILITY CURRENT STATUS: HCPCS | Mod: CI

## 2017-05-11 PROCEDURE — 85025 COMPLETE CBC W/AUTO DIFF WBC: CPT

## 2017-05-11 PROCEDURE — 93010 ELECTROCARDIOGRAM REPORT: CPT | Performed by: INTERNAL MEDICINE

## 2017-05-11 PROCEDURE — 36415 COLL VENOUS BLD VENIPUNCTURE: CPT

## 2017-05-11 PROCEDURE — 97161 PT EVAL LOW COMPLEX 20 MIN: CPT

## 2017-05-11 RX ORDER — HYDROCODONE BITARTRATE AND ACETAMINOPHEN 5; 325 MG/1; MG/1
1-2 TABLET ORAL EVERY 6 HOURS PRN
Qty: 80 TAB | Refills: 0 | Status: SHIPPED | OUTPATIENT
Start: 2017-05-11 | End: 2018-07-30

## 2017-05-11 RX ORDER — PSEUDOEPHEDRINE HCL 30 MG
100 TABLET ORAL 2 TIMES DAILY
Qty: 60 CAP | COMMUNITY
Start: 2017-05-11 | End: 2018-07-30

## 2017-05-11 RX ORDER — METHOCARBAMOL 750 MG/1
750 TABLET, FILM COATED ORAL 3 TIMES DAILY PRN
Qty: 60 TAB | Refills: 0 | Status: SHIPPED | OUTPATIENT
Start: 2017-05-11 | End: 2018-07-30

## 2017-05-11 RX ADMIN — CEFAZOLIN SODIUM 2 G: 2 INJECTION, SOLUTION INTRAVENOUS at 05:49

## 2017-05-11 RX ADMIN — OXYCODONE HYDROCHLORIDE 5 MG: 10 TABLET ORAL at 12:17

## 2017-05-11 RX ADMIN — ACETAMINOPHEN 1000 MG: 500 TABLET, FILM COATED ORAL at 00:33

## 2017-05-11 RX ADMIN — ACETAMINOPHEN 1000 MG: 500 TABLET, FILM COATED ORAL at 05:49

## 2017-05-11 RX ADMIN — DOCUSATE SODIUM 100 MG: 100 CAPSULE ORAL at 10:13

## 2017-05-11 RX ADMIN — ACETAMINOPHEN 1000 MG: 500 TABLET, FILM COATED ORAL at 11:51

## 2017-05-11 ASSESSMENT — LIFESTYLE VARIABLES
EVER_SMOKED: NEVER
DO YOU DRINK ALCOHOL: NO
EVER_SMOKED: NEVER

## 2017-05-11 ASSESSMENT — GAIT ASSESSMENTS
DISTANCE (FEET): 200
GAIT LEVEL OF ASSIST: STAND BY ASSIST

## 2017-05-11 ASSESSMENT — PAIN SCALES - GENERAL
PAINLEVEL_OUTOF10: ASSUMED PAIN PRESENT
PAINLEVEL_OUTOF10: 7
PAINLEVEL_OUTOF10: 6

## 2017-05-11 ASSESSMENT — ACTIVITIES OF DAILY LIVING (ADL): TOILETING: INDEPENDENT

## 2017-05-11 NOTE — PROGRESS NOTES
Pt complained of L chest pain, L shoulder and L back. Pt denies shortness of breath, pt A&Ox4, pt not pale, not diaphoretic.    Rapid nurse called.

## 2017-05-11 NOTE — DISCHARGE INSTRUCTIONS
Discharge Instructions    Discharged to home by car with relative. Discharged via wheelchair, hospital escort: Yes.  Special equipment needed: C-Collar    Be sure to schedule a follow-up appointment with your primary care doctor or any specialists as instructed.     Discharge Plan:   Diet Plan: Discussed  Activity Level: Discussed  Confirmed Follow up Appointment: Patient to Call and Schedule Appointment  Confirmed Symptoms Management: Discussed  Medication Reconciliation Updated: Yes  Influenza Vaccine Indication: Not indicated: Previously immunized this influenza season and > 8 years of age    I understand that a diet low in cholesterol, fat, and sodium is recommended for good health. Unless I have been given specific instructions below for another diet, I accept this instruction as my diet prescription.   Other diet: Regular    Special Instructions: Follow up with APN at [facility] in 2 weeks [phone]   Follow up with Dr. Hoffman in 6 weeks   No pushing, pulling, lifting greater than 10 pounds   No repetitive motion above shoulder level   Ok to shower, pat incision dry - 24 hours after drain was removed   No non-steroidal anti-inflammatory medications or aspirin until cleared by Dr. Hoffman   Ambulate as much is comfortable   No driving for at least 2 weeks following surgery or until cleared   Obtain over the counter senekot take 1-2 tablets daily while taking narcotic pain medication   Do not return to work until cleared by physician   Wear brace as needed for comfort    · Is patient discharged on Warfarin / Coumadin?   No     · Is patient Post Blood Transfusion?  No    Incision Care  An incision is when a surgeon cuts into your body. After surgery, the incision needs to be cared for properly to prevent infection.   HOW TO CARE FOR YOUR INCISION  · Take medicines only as directed by your health care provider.  · There are many different ways to close and cover an incision, including stitches, skin  glue, and adhesive strips. Follow your health care provider's instructions on:  ¨ Incision care.  ¨ Bandage (dressing) changes and removal.  ¨ Incision closure removal.  · Do not take baths, swim, or use a hot tub until your health care provider approves. You may shower as directed by your health care provider.  · Resume your normal diet and activities as directed.  · Use anti-itch medicine (such as an antihistamine) as directed by your health care provider. The incision may itch while it is healing. Do not pick or scratch at the incision.  · Drink enough fluid to keep your urine clear or pale yellow.  SEEK MEDICAL CARE IF:   · You have drainage, redness, swelling, or pain at your incision site.  · You have muscle aches, chills, or a general ill feeling.  · You notice a bad smell coming from the incision or dressing.  · Your incision edges separate after the sutures, staples, or skin adhesive strips have been removed.  · You have persistent nausea or vomiting.  · You have a fever.  · You are dizzy.  SEEK IMMEDIATE MEDICAL CARE IF:   · You have a rash.  · You faint.  · You have difficulty breathing.  MAKE SURE YOU:   · Understand these instructions.  · Will watch your condition.  · Will get help right away if you are not doing well or get worse.     This information is not intended to replace advice given to you by your health care provider. Make sure you discuss any questions you have with your health care provider.     Document Released: 07/07/2006 Document Revised: 01/08/2016 Document Reviewed: 02/11/2015  [employer] Interactive Patient Education ©2016 [employer]          Depression / Suicide Risk    As you are discharged from this [facility] facility, it is important to learn how to keep safe from harming yourself.    Recognize the warning signs:  · Abrupt changes in personality, positive or negative- including increase in energy   · Giving away possessions  · Change in eating patterns- significant weight  changes-  positive or negative  · Change in sleeping patterns- unable to sleep or sleeping all the time   · Unwillingness or inability to communicate  · Depression  · Unusual sadness, discouragement and loneliness  · Talk of wanting to die  · Neglect of personal appearance   · Rebelliousness- reckless behavior  · Withdrawal from people/activities they love  · Confusion- inability to concentrate     If you or a loved one observes any of these behaviors or has concerns about self-harm, here's what you can do:  · Talk about it- your feelings and reasons for harming yourself  · Remove any means that you might use to hurt yourself (examples: pills, rope, extension cords, firearm)  · Get professional help from the community (Mental Health, Substance Abuse, psychological counseling)  · Do not be alone:Call your Safe Contact- someone whom you trust who will be there for you.  · Call your local CRISIS HOTLINE 991-3994 or 803-657-6251  · Call your local Children's Mobile Crisis Response Team [place] (507) 250-2793 or www.[employer]  · Call the toll free National Suicide Prevention Hotlines   · National Suicide Prevention Lifeline 699-444-QBCT (2994)  · National Hope Line Network 800-SUICIDE (184-9396)

## 2017-05-11 NOTE — PROGRESS NOTES
Contacted Dr. Hoffman to notify him about pt's lab results: EKG: negative, sinus rhythm; Troponin: negative.    Drain is out, pt does not complain of any difficulty breathing, pain was 3/10. Pt verbalized she's ready to go home.    Per Dr. Hoffman, pt is ok to be discharged.

## 2017-05-11 NOTE — THERAPY
"Physical Therapy Evaluation completed.   Bed Mobility:  Supine to Sit: Stand by Assist  Transfers: Sit to Stand: Stand by Assist  Gait: Level Of Assist: Stand by Assist with No Equipment Needed       Plan of Care: Patient with no further skilled PT needs in the acute care setting at this time  Discharge Recommendations: Equipment: No Equipment Needed. Post-acute therapy Currently anticipate no further skilled therapy needs once patient is discharged from the inpatient setting.    See \"Rehab Therapy-Acute\" Patient Summary Report for complete documentation.     "

## 2017-05-11 NOTE — CODE DOCUMENTATION
Bedside RN called Dr. Hoffman.  Updated on chest pain/RRT.  Orders given and followed.  Patient transferred from chair to bed with standby assist.

## 2017-05-11 NOTE — THERAPY
"Occupational Therapy Evaluation completed.   Functional Status:  SBA w/bed mobility via log roll HOB flat no rails, spv w/LB dressing sit>stand walking in room no AD no LOB spv w/grooming standing at sink educated on PRN soft collar, educated on spinal precautions during ADL's able to apply, spv w/tub-shower txf ambulated back to room no overt c/o pain or fatigue in chair alarm on call light w/in reach RN aware   Plan of Care: Patient with no further skilled OT needs in the acute care setting at this time  Discharge Recommendations:  Equipment: No Equipment Needed. Post-acute therapy Currently anticipate no further skilled therapy needs once patient is discharged from the inpatient setting.    See \"Rehab Therapy-Acute\" Patient Summary Report for complete documentation.    "

## 2017-05-11 NOTE — OP REPORT
DATE OF SERVICE:  05/10/2017    DATE OF PROCEDURE:  5/10/2017    PREOPERATIVE DIAGNOSES:  Cervical radiculopathy and myelopathy.    POSTOPERATIVE DIAGNOSES:  Cervical radiculopathy and myelopathy.    PROCEDURES PERFORMRED:  1.  Anterior cervical 5-6, 6-7 diskectomy and interbody arthrodesis.  2.  Bilateral neural foraminotomies, cervical 6 and 7 roots.  3.  Implantation of Medtronic PEEK interbody cage at cervical 5-6 and 6-7.  4.  Anterior cervical plating, 5-7 using 14 mm self-drilling, self-tapping   screws and Medtronic transitional plate.  5.  Use of operative microscope for microdissection.  6.  Use of locally harvested morselized autograft.  7.  Use of allograft.    SURGEON:  Bossman Hoffman MD    ASSISTANTS:  BUTCH Silva and BUTCH Garcia    ANESTHESIA:  General.    COMPLICATIONS:  None.    ESTIMATED BLOOD LOSS:  Minimal.    DESCRIPTION OF PROCEDURE:  Patient was brought to the operating room,   identified in the usual fashion.  General endotracheal anesthesia was induced   by the anesthesia team.  Patient was then placed supine on the operating room   table.  All pressure points were meticulously padded.  Transverse incision was   marked on the skin using fluoroscopic guidance.  She was then prepped and   draped in the usual sterile fashion.  Local anesthesia was infiltrated in the   subcutaneous tissue.  A 10 blade was used to incise the skin.  Dissection was   carried down through platysma using Bovie electrocautery.  Retractors were put   in place.  We then undermined platysma using sharp dissection.  We identified   the medial border of the sternocleidomastoid muscle and identified the strap   muscles.  I then went in between them.  We confirmed that esophagus was medial   and carotid and the carotid sheath was lateral.  We identified the anterior   spine.  Spinal needle was placed at what we believed to be cervical 5-6.  Film   was taken confirming that we were at the correct  level.  This was then marked   with methylene blue.  We then cleaned off the anterior spine and reflected   longus colli muscles laterally bilaterally using Bovie electrocautery.  We   then put in the retractor system underneath longus colli bilaterally, then   superiorly and inferiorly and brought in the operative microscope.  We first   addressed cervical 5-6.  We placed [place] pins at cervical 5 and 6 for gentle   disk space distraction.  We then used a 15 blade to incise the disk space and   then remove disk contents using a combination of alley and a pituitary and   upbiting curette.  Anterior osteophytes were harvested using a Kerrison 3   punch.  We then drilled down the endplates flat using a high-speed air drill.    Posterior osteophytes were removed with Kerrison 1 and 2 punches.  Patient   had very severe left greater than right foraminal stenosis and lateral recess   stenosis as well which was quite difficult to remove in this bony component,   but we were able to do so with excellent results.  We used Microsect curette   to confirm we had excellent decompression of bilateral neural foramina and the   central canal.  Copious amounts of antibiotic irrigation were used to wash   out the wound.  FloSeal with gentle tamponade was used for hemostasis.  We   then placed a 6 mm cage filled with locally harvested morselized autograft and   allograft into the disk space and gently countersunk it using a tamp.  We   then removed the [place] pin at cervical 5 and replaced at cervical 7 and then   addressed the C6-C7.  This was done in the same sequence of events.  It was   left foraminal stenosis in cervical 5-6, but the same sequence of events was   employed.  We placed again a 6 mm cage filled with autograft and allograft and   gently inserted the disc space and countersunk it using a tamp.  This was   done after we had placed irrigation and FloSeal with gentle tamponade for   hemostasis.  All the [place] pins were  removed.  We then placed an anterior   cervical plate with 14 mm self-drilling, self-tapping screws.  All screws had   excellent purchase, were final tightened and Cam locked to the plate.  A film   was taken confirming that we were at the correct levels and all the hardware   looked to be in excellent position.  We then removed all retractors.    Hemostasis on longus colli was achieved with bipolar electrocautery.  We left   a Hemovac drain through a separate stab incision and then closed the incision   in layers and topped with Dermabond.  All sponge and needle counts were   correct x2 at the end the case.  I was present and scrubbed for the entire   procedure.  Patient awakened and was transferred to the recovery room in   stable condition.       ____________________________________     ADDY GRIFFIN MD    CPD / NTS    DD:  05/11/2017 10:42:46  DT:  05/11/2017 11:52:38    D#:  [id number]  Job#:  [id number]

## 2017-05-11 NOTE — PROGRESS NOTES
Contacted Dr. Hoffman.    Dr. Hoffman instructed RN to order CBC, BMP, EKG and troponin labs and to call him once the lab results are in.    EKG is ok per rapid response nurse.

## 2017-05-11 NOTE — PROGRESS NOTES
Received report from off going nurse. Nurse stated that pt had no sig med event in PACU. Pt walked to bed and has mild c/o pain. Will medicate per order. Bed alarm on. Vss. Will continue to monitor.

## 2017-05-11 NOTE — CODE DOCUMENTATION
Lab drawing CBC, BMP and trops. Plan to administer PO pain medication.  RRT team with follow up with lab results.

## 2017-05-11 NOTE — PROGRESS NOTES
Subjective:  No events; all weakness and n/t is gone      A&O x3, GCS 15  PERRL, EOMI  Face symm, tongue midline  CABRERA with FS, no drift  C/d/i          Temp (24hrs), Av.3 °C (97.3 °F), Min:35.7 °C (96.3 °F), Max:36.6 °C (97.8 °F)    Pulse: 64, Heart Rate (Monitored): (!) 56, Respiration: 16, NIBP: 126/66 mmHg, Blood Pressure: 112/63 mmHg, Weight: 92.7 kg (204 lb 5.9 oz), Pulse Oximetry: 95 %, O2 (LPM): 0            Intake/Output Summary (Last 24 hours) at 17 0822  Last data filed at 17 0600   Gross per 24 hour   Intake   1500 ml   Output     70 ml   Net   1430 ml            • MD ALERT...Do not administer NSAIDS or ASPIRIN unless ORDERED By Neurosurgery  1 Each     • docusate sodium  100 mg     • senna-docusate  1 Tab     • senna-docusate  1 Tab     • polyethylene glycol/lytes  1 Packet     • magnesium hydroxide  30 mL     • bisacodyl  10 mg     • fleet  1 Each     • 0.9 % NaCl with KCl 20 mEq 1,000 mL   100 mL/hr at 05/10/17 2007   • acetaminophen  1,000 mg     • oxycodone immediate-release  5 mg     • oxycodone immediate release  10 mg     • morphine injection  4 mg     • diphenhydrAMINE  25 mg      Or   • diphenhydrAMINE  25 mg     • ondansetron  4 mg     • ondansetron  4 mg     • promethazine  12.5-25 mg     • promethazine  12.5-25 mg     • prochlorperazine  5-10 mg     • methocarbamol  750 mg      Or   • cyclobenzaprine  10 mg      Or   • diazepam  5 mg      Or   • diazepam  5 mg     • labetalol  10 mg     • hydrALAZINE  10 mg     • clonidine  0.1 mg     • benzocaine-menthol  1 Lozenge         No results for input(s): WBC, RBC, HEMOGLOBIN, ISTATHGB, HEMATOCRIT, ISTATHEMCRT, MCV, MCH, PLATELETCT in the last 72 hours.  No results for input(s): SODIUM, ISTATSODIUM, POTASSIUM, ISTATPOTASS, CHLORIDE, ISTATCHLORI, CO2, ISTATCO2, GLUCOSE, ISTATGLUCOS, BUN, ISTATBUN, CREATININE, ISTATCREAT, CALCIUM in the last 72 hours.        A/P: POD #1 C5-7 ACDF  Doing well; wants to go home  D/c home on robaxin and  norco 5's

## 2017-05-11 NOTE — PROGRESS NOTES
Pt was A&Ox4, denied any numbness/tingling, pain was 6/10 (refused pain medication and requested for coffee instead).      Bed alarm on, call light within reach, pt educated on importance of using call light to ask for assistance. Pt verbalized understanding.

## 2017-05-13 NOTE — DISCHARGE SUMMARY
DATE OF ADMISSION:  05/10/2017    DATE OF DISCHARGE:  05/11/2017    ADMITTING DIAGNOSES:  Cervical radiculopathy and myelopathy.    HISTORY OF PRESENT ILLNESS:  Please refer to the previously dictated history   and physical for complete details.    COURSE OF HOSPITALIZATION:  The patient was admitted to [facility].  On date of admission, she was brought to the operating room   where she underwent ACDF 5 through 7 with Dr. Bossman Hoffman.    Postoperatively, she did very well.  All weakness and numbness and tingling   had subsided since surgery.  Her strength was 5/5, sensation equal, her   incision clean, dry and intact.  Her Hemovac had been removed.  She was doing   well and she is discharged on postoperative day #1.    DISCHARGE MEDICATIONS:  1.  Norco 5/325 mg tablets 1-2 every 6 hours as needed for pain, #80, no   refills.  2.  Robaxin 750 mg 1 p.o. t.i.d. p.r.n. spasms, #60, no refills.    DISCHARGE INSTRUCTIONS:  The patient was given standard postoperative   instructions.  She was instructed to follow up with us at 2 and 6 weeks   postoperatively.  Any questions or concerns, she was reminded not to hesitate,   to give us a call.  She is discharged home in stable medical condition with   instructions and medications outlined above.       ____________________________________     BUTCH MCNEAL / KIMMIE    DD:  05/12/2017 16:52:04  DT:  05/13/2017 15:48:11    D#:  [id number]  Job#:  [id number]

## 2017-05-15 ENCOUNTER — APPOINTMENT (OUTPATIENT)
Dept: PHYSICAL THERAPY | Facility: REHABILITATION | Age: 42
End: 2017-05-15
Attending: NURSE PRACTITIONER

## 2017-05-22 ENCOUNTER — APPOINTMENT (OUTPATIENT)
Dept: PHYSICAL THERAPY | Facility: REHABILITATION | Age: 42
End: 2017-05-22
Attending: NURSE PRACTITIONER

## 2017-05-26 ENCOUNTER — HOSPITAL ENCOUNTER (OUTPATIENT)
Dept: RADIOLOGY | Facility: MEDICAL CENTER | Age: 42
End: 2017-05-26
Attending: NURSE PRACTITIONER
Payer: COMMERCIAL

## 2017-05-26 DIAGNOSIS — M48.02 SPINAL STENOSIS IN CERVICAL REGION: ICD-10-CM

## 2017-05-26 PROCEDURE — 72040 X-RAY EXAM NECK SPINE 2-3 VW: CPT

## 2017-06-05 ENCOUNTER — APPOINTMENT (OUTPATIENT)
Dept: PHYSICAL THERAPY | Facility: REHABILITATION | Age: 42
End: 2017-06-05
Attending: NURSE PRACTITIONER
Payer: COMMERCIAL

## 2017-06-21 ENCOUNTER — HOSPITAL ENCOUNTER (OUTPATIENT)
Dept: RADIOLOGY | Facility: MEDICAL CENTER | Age: 42
End: 2017-06-21
Attending: NURSE PRACTITIONER
Payer: COMMERCIAL

## 2017-06-21 DIAGNOSIS — M48.02 SPINAL STENOSIS IN CERVICAL REGION: ICD-10-CM

## 2017-06-21 PROCEDURE — 72040 X-RAY EXAM NECK SPINE 2-3 VW: CPT

## 2017-07-14 ENCOUNTER — APPOINTMENT (OUTPATIENT)
Dept: PHYSICAL THERAPY | Facility: REHABILITATION | Age: 42
End: 2017-07-14
Payer: MEDICAID

## 2017-07-14 ENCOUNTER — HOSPITAL ENCOUNTER (OUTPATIENT)
Dept: PHYSICAL THERAPY | Facility: REHABILITATION | Age: 42
End: 2017-07-14
Attending: NURSE PRACTITIONER
Payer: COMMERCIAL

## 2017-07-14 PROCEDURE — 97161 PT EVAL LOW COMPLEX 20 MIN: CPT

## 2018-07-30 ENCOUNTER — APPOINTMENT (OUTPATIENT)
Dept: RADIOLOGY | Facility: MEDICAL CENTER | Age: 43
End: 2018-07-30
Attending: INTERNAL MEDICINE

## 2018-07-30 ENCOUNTER — APPOINTMENT (OUTPATIENT)
Dept: RADIOLOGY | Facility: MEDICAL CENTER | Age: 43
End: 2018-07-30
Attending: EMERGENCY MEDICINE

## 2018-07-30 ENCOUNTER — HOSPITAL ENCOUNTER (OUTPATIENT)
Facility: MEDICAL CENTER | Age: 43
End: 2018-07-30
Attending: EMERGENCY MEDICINE | Admitting: INTERNAL MEDICINE

## 2018-07-30 VITALS
SYSTOLIC BLOOD PRESSURE: 117 MMHG | HEART RATE: 81 BPM | RESPIRATION RATE: 19 BRPM | OXYGEN SATURATION: 96 % | HEIGHT: 65 IN | DIASTOLIC BLOOD PRESSURE: 66 MMHG | BODY MASS INDEX: 36.03 KG/M2 | WEIGHT: 216.27 LBS | TEMPERATURE: 98.1 F

## 2018-07-30 PROBLEM — G44.209 TENSION TYPE HEADACHE: Status: ACTIVE | Noted: 2018-07-30

## 2018-07-30 PROBLEM — R73.9 HYPERGLYCEMIA: Status: ACTIVE | Noted: 2018-07-30

## 2018-07-30 PROBLEM — R29.810 FACIAL DROOP: Status: ACTIVE | Noted: 2018-07-30

## 2018-07-30 LAB
ALBUMIN SERPL BCP-MCNC: 4.3 G/DL (ref 3.2–4.9)
ALBUMIN/GLOB SERPL: 1.7 G/DL
ALP SERPL-CCNC: 74 U/L (ref 30–99)
ALT SERPL-CCNC: 16 U/L (ref 2–50)
ANION GAP SERPL CALC-SCNC: 7 MMOL/L (ref 0–11.9)
APTT PPP: 27.5 SEC (ref 24.7–36)
AST SERPL-CCNC: 16 U/L (ref 12–45)
BASOPHILS # BLD AUTO: 0.7 % (ref 0–1.8)
BASOPHILS # BLD: 0.05 K/UL (ref 0–0.12)
BILIRUB SERPL-MCNC: 0.3 MG/DL (ref 0.1–1.5)
BUN SERPL-MCNC: 15 MG/DL (ref 8–22)
CALCIUM SERPL-MCNC: 9.1 MG/DL (ref 8.5–10.5)
CHLORIDE SERPL-SCNC: 108 MMOL/L (ref 96–112)
CO2 SERPL-SCNC: 25 MMOL/L (ref 20–33)
CREAT SERPL-MCNC: 0.9 MG/DL (ref 0.5–1.4)
CRP SERPL HS-MCNC: 0.45 MG/DL (ref 0–0.75)
EKG IMPRESSION: NORMAL
EOSINOPHIL # BLD AUTO: 0.1 K/UL (ref 0–0.51)
EOSINOPHIL NFR BLD: 1.4 % (ref 0–6.9)
ERYTHROCYTE [DISTWIDTH] IN BLOOD BY AUTOMATED COUNT: 47.7 FL (ref 35.9–50)
ERYTHROCYTE [SEDIMENTATION RATE] IN BLOOD BY WESTERGREN METHOD: 21 MM/HOUR (ref 0–20)
GLOBULIN SER CALC-MCNC: 2.6 G/DL (ref 1.9–3.5)
GLUCOSE SERPL-MCNC: 109 MG/DL (ref 65–99)
HCT VFR BLD AUTO: 41.7 % (ref 37–47)
HGB BLD-MCNC: 13.7 G/DL (ref 12–16)
IMM GRANULOCYTES # BLD AUTO: 0.02 K/UL (ref 0–0.11)
IMM GRANULOCYTES NFR BLD AUTO: 0.3 % (ref 0–0.9)
INR PPP: 0.96 (ref 0.87–1.13)
LYMPHOCYTES # BLD AUTO: 2.43 K/UL (ref 1–4.8)
LYMPHOCYTES NFR BLD: 33.5 % (ref 22–41)
MCH RBC QN AUTO: 31.6 PG (ref 27–33)
MCHC RBC AUTO-ENTMCNC: 32.9 G/DL (ref 33.6–35)
MCV RBC AUTO: 96.1 FL (ref 81.4–97.8)
MONOCYTES # BLD AUTO: 0.57 K/UL (ref 0–0.85)
MONOCYTES NFR BLD AUTO: 7.9 % (ref 0–13.4)
NEUTROPHILS # BLD AUTO: 4.09 K/UL (ref 2–7.15)
NEUTROPHILS NFR BLD: 56.2 % (ref 44–72)
NRBC # BLD AUTO: 0 K/UL
NRBC BLD-RTO: 0 /100 WBC
PLATELET # BLD AUTO: 334 K/UL (ref 164–446)
PMV BLD AUTO: 10 FL (ref 9–12.9)
POTASSIUM SERPL-SCNC: 3.9 MMOL/L (ref 3.6–5.5)
PROT SERPL-MCNC: 6.9 G/DL (ref 6–8.2)
PROTHROMBIN TIME: 12.5 SEC (ref 12–14.6)
RBC # BLD AUTO: 4.34 M/UL (ref 4.2–5.4)
SODIUM SERPL-SCNC: 140 MMOL/L (ref 135–145)
WBC # BLD AUTO: 7.3 K/UL (ref 4.8–10.8)

## 2018-07-30 PROCEDURE — 85025 COMPLETE CBC W/AUTO DIFF WBC: CPT

## 2018-07-30 PROCEDURE — 83036 HEMOGLOBIN GLYCOSYLATED A1C: CPT

## 2018-07-30 PROCEDURE — 97161 PT EVAL LOW COMPLEX 20 MIN: CPT

## 2018-07-30 PROCEDURE — G8979 MOBILITY GOAL STATUS: HCPCS | Mod: CI

## 2018-07-30 PROCEDURE — G8989 SELF CARE D/C STATUS: HCPCS | Mod: CH

## 2018-07-30 PROCEDURE — G0378 HOSPITAL OBSERVATION PER HR: HCPCS

## 2018-07-30 PROCEDURE — 86140 C-REACTIVE PROTEIN: CPT

## 2018-07-30 PROCEDURE — 73564 X-RAY EXAM KNEE 4 OR MORE: CPT | Mod: LT

## 2018-07-30 PROCEDURE — 70450 CT HEAD/BRAIN W/O DYE: CPT

## 2018-07-30 PROCEDURE — 85730 THROMBOPLASTIN TIME PARTIAL: CPT

## 2018-07-30 PROCEDURE — 700117 HCHG RX CONTRAST REV CODE 255: Performed by: EMERGENCY MEDICINE

## 2018-07-30 PROCEDURE — 93005 ELECTROCARDIOGRAM TRACING: CPT | Performed by: EMERGENCY MEDICINE

## 2018-07-30 PROCEDURE — A9270 NON-COVERED ITEM OR SERVICE: HCPCS | Performed by: INTERNAL MEDICINE

## 2018-07-30 PROCEDURE — G8988 SELF CARE GOAL STATUS: HCPCS | Mod: CH

## 2018-07-30 PROCEDURE — 85610 PROTHROMBIN TIME: CPT

## 2018-07-30 PROCEDURE — 97165 OT EVAL LOW COMPLEX 30 MIN: CPT

## 2018-07-30 PROCEDURE — G8980 MOBILITY D/C STATUS: HCPCS | Mod: CI

## 2018-07-30 PROCEDURE — 70498 CT ANGIOGRAPHY NECK: CPT

## 2018-07-30 PROCEDURE — G8987 SELF CARE CURRENT STATUS: HCPCS | Mod: CH

## 2018-07-30 PROCEDURE — 99285 EMERGENCY DEPT VISIT HI MDM: CPT

## 2018-07-30 PROCEDURE — 700102 HCHG RX REV CODE 250 W/ 637 OVERRIDE(OP): Performed by: INTERNAL MEDICINE

## 2018-07-30 PROCEDURE — 85652 RBC SED RATE AUTOMATED: CPT

## 2018-07-30 PROCEDURE — 99220 PR INITIAL OBSERVATION CARE,LEVL III: CPT | Performed by: INTERNAL MEDICINE

## 2018-07-30 PROCEDURE — 70496 CT ANGIOGRAPHY HEAD: CPT

## 2018-07-30 PROCEDURE — 80053 COMPREHEN METABOLIC PANEL: CPT

## 2018-07-30 PROCEDURE — 70551 MRI BRAIN STEM W/O DYE: CPT

## 2018-07-30 PROCEDURE — 36415 COLL VENOUS BLD VENIPUNCTURE: CPT

## 2018-07-30 PROCEDURE — 700111 HCHG RX REV CODE 636 W/ 250 OVERRIDE (IP): Performed by: EMERGENCY MEDICINE

## 2018-07-30 PROCEDURE — 96375 TX/PRO/DX INJ NEW DRUG ADDON: CPT

## 2018-07-30 PROCEDURE — 96374 THER/PROPH/DIAG INJ IV PUSH: CPT

## 2018-07-30 PROCEDURE — G8978 MOBILITY CURRENT STATUS: HCPCS | Mod: CI

## 2018-07-30 RX ORDER — PROMETHAZINE HYDROCHLORIDE 25 MG/1
12.5-25 TABLET ORAL EVERY 4 HOURS PRN
Status: DISCONTINUED | OUTPATIENT
Start: 2018-07-30 | End: 2018-07-30 | Stop reason: HOSPADM

## 2018-07-30 RX ORDER — DEXAMETHASONE SODIUM PHOSPHATE 10 MG/ML
10 INJECTION, SOLUTION INTRAMUSCULAR; INTRAVENOUS ONCE
Status: COMPLETED | OUTPATIENT
Start: 2018-07-30 | End: 2018-07-30

## 2018-07-30 RX ORDER — OMEPRAZOLE 20 MG/1
20 CAPSULE, DELAYED RELEASE ORAL EVERY EVENING
COMMUNITY

## 2018-07-30 RX ORDER — SODIUM CHLORIDE 9 MG/ML
INJECTION, SOLUTION INTRAVENOUS CONTINUOUS
Status: DISCONTINUED | OUTPATIENT
Start: 2018-07-30 | End: 2018-07-30 | Stop reason: HOSPADM

## 2018-07-30 RX ORDER — MECLIZINE HYDROCHLORIDE 25 MG/1
25 TABLET ORAL 3 TIMES DAILY PRN
Status: DISCONTINUED | OUTPATIENT
Start: 2018-07-30 | End: 2018-07-30 | Stop reason: HOSPADM

## 2018-07-30 RX ORDER — ASPIRIN 325 MG
325 TABLET ORAL DAILY
Status: DISCONTINUED | OUTPATIENT
Start: 2018-07-30 | End: 2018-07-30 | Stop reason: HOSPADM

## 2018-07-30 RX ORDER — ASPIRIN 81 MG/1
324 TABLET, CHEWABLE ORAL DAILY
Status: DISCONTINUED | OUTPATIENT
Start: 2018-07-30 | End: 2018-07-30 | Stop reason: HOSPADM

## 2018-07-30 RX ORDER — ACETAMINOPHEN 325 MG/1
650 TABLET ORAL EVERY 6 HOURS PRN
Status: DISCONTINUED | OUTPATIENT
Start: 2018-07-30 | End: 2018-07-30 | Stop reason: HOSPADM

## 2018-07-30 RX ORDER — ATORVASTATIN CALCIUM 80 MG/1
80 TABLET, FILM COATED ORAL EVERY EVENING
Status: DISCONTINUED | OUTPATIENT
Start: 2018-07-30 | End: 2018-07-30 | Stop reason: HOSPADM

## 2018-07-30 RX ORDER — PROMETHAZINE HYDROCHLORIDE 25 MG/1
12.5-25 SUPPOSITORY RECTAL EVERY 4 HOURS PRN
Status: DISCONTINUED | OUTPATIENT
Start: 2018-07-30 | End: 2018-07-30 | Stop reason: HOSPADM

## 2018-07-30 RX ORDER — DIPHENHYDRAMINE HYDROCHLORIDE 50 MG/ML
25 INJECTION INTRAMUSCULAR; INTRAVENOUS ONCE
Status: COMPLETED | OUTPATIENT
Start: 2018-07-30 | End: 2018-07-30

## 2018-07-30 RX ORDER — ONDANSETRON 2 MG/ML
4 INJECTION INTRAMUSCULAR; INTRAVENOUS EVERY 4 HOURS PRN
Status: DISCONTINUED | OUTPATIENT
Start: 2018-07-30 | End: 2018-07-30 | Stop reason: HOSPADM

## 2018-07-30 RX ORDER — POLYETHYLENE GLYCOL 3350 17 G/17G
1 POWDER, FOR SOLUTION ORAL
Status: DISCONTINUED | OUTPATIENT
Start: 2018-07-30 | End: 2018-07-30 | Stop reason: HOSPADM

## 2018-07-30 RX ORDER — MECLIZINE HYDROCHLORIDE 25 MG/1
25 TABLET ORAL 3 TIMES DAILY PRN
Qty: 30 TAB | Refills: 0 | Status: SHIPPED | OUTPATIENT
Start: 2018-07-30

## 2018-07-30 RX ORDER — METOCLOPRAMIDE HYDROCHLORIDE 5 MG/ML
10 INJECTION INTRAMUSCULAR; INTRAVENOUS ONCE
Status: COMPLETED | OUTPATIENT
Start: 2018-07-30 | End: 2018-07-30

## 2018-07-30 RX ORDER — ONDANSETRON 4 MG/1
4 TABLET, ORALLY DISINTEGRATING ORAL EVERY 4 HOURS PRN
Status: DISCONTINUED | OUTPATIENT
Start: 2018-07-30 | End: 2018-07-30 | Stop reason: HOSPADM

## 2018-07-30 RX ORDER — BISACODYL 10 MG
10 SUPPOSITORY, RECTAL RECTAL
Status: DISCONTINUED | OUTPATIENT
Start: 2018-07-30 | End: 2018-07-30 | Stop reason: HOSPADM

## 2018-07-30 RX ORDER — AMOXICILLIN 250 MG
2 CAPSULE ORAL 2 TIMES DAILY
Status: DISCONTINUED | OUTPATIENT
Start: 2018-07-30 | End: 2018-07-30 | Stop reason: HOSPADM

## 2018-07-30 RX ORDER — ASPIRIN 300 MG/1
300 SUPPOSITORY RECTAL DAILY
Status: DISCONTINUED | OUTPATIENT
Start: 2018-07-30 | End: 2018-07-30 | Stop reason: HOSPADM

## 2018-07-30 RX ADMIN — IOHEXOL 100 ML: 350 INJECTION, SOLUTION INTRAVENOUS at 07:38

## 2018-07-30 RX ADMIN — STANDARDIZED SENNA CONCENTRATE AND DOCUSATE SODIUM 2 TABLET: 8.6; 5 TABLET, FILM COATED ORAL at 10:09

## 2018-07-30 RX ADMIN — DEXAMETHASONE SODIUM PHOSPHATE 10 MG: 10 INJECTION, SOLUTION INTRAMUSCULAR; INTRAVENOUS at 07:38

## 2018-07-30 RX ADMIN — METOCLOPRAMIDE 10 MG: 5 INJECTION, SOLUTION INTRAMUSCULAR; INTRAVENOUS at 06:32

## 2018-07-30 RX ADMIN — DIPHENHYDRAMINE HYDROCHLORIDE 25 MG: 50 INJECTION, SOLUTION INTRAMUSCULAR; INTRAVENOUS at 06:32

## 2018-07-30 RX ADMIN — ASPIRIN 325 MG: 325 TABLET ORAL at 10:09

## 2018-07-30 ASSESSMENT — ENCOUNTER SYMPTOMS
HEADACHES: 1
DIARRHEA: 0
EYE DISCHARGE: 0
DEPRESSION: 0
SHORTNESS OF BREATH: 0
WEIGHT LOSS: 0
NERVOUS/ANXIOUS: 0
INSOMNIA: 0
STRIDOR: 0
FOCAL WEAKNESS: 0
ABDOMINAL PAIN: 0
VOMITING: 0
CHILLS: 0
COUGH: 0
BACK PAIN: 0
FEVER: 0
SEIZURES: 0
BLURRED VISION: 0
DIZZINESS: 0
EYE REDNESS: 0
EYE PAIN: 0
MYALGIAS: 0
ORTHOPNEA: 0
SPUTUM PRODUCTION: 0
NAUSEA: 0
PALPITATIONS: 0
NECK PAIN: 0
HEARTBURN: 0

## 2018-07-30 ASSESSMENT — COGNITIVE AND FUNCTIONAL STATUS - GENERAL
MOBILITY SCORE: 23
SUGGESTED CMS G CODE MODIFIER MOBILITY: CI
DAILY ACTIVITIY SCORE: 24
CLIMB 3 TO 5 STEPS WITH RAILING: A LITTLE
SUGGESTED CMS G CODE MODIFIER DAILY ACTIVITY: CH

## 2018-07-30 ASSESSMENT — PAIN SCALES - GENERAL
PAINLEVEL_OUTOF10: 10
PAINLEVEL_OUTOF10: 10
PAINLEVEL_OUTOF10: 0

## 2018-07-30 ASSESSMENT — GAIT ASSESSMENTS
GAIT LEVEL OF ASSIST: SUPERVISED
DISTANCE (FEET): 150

## 2018-07-30 ASSESSMENT — ACTIVITIES OF DAILY LIVING (ADL): TOILETING: INDEPENDENT

## 2018-07-30 ASSESSMENT — PATIENT HEALTH QUESTIONNAIRE - PHQ9
1. LITTLE INTEREST OR PLEASURE IN DOING THINGS: NOT AT ALL
SUM OF ALL RESPONSES TO PHQ9 QUESTIONS 1 AND 2: 0
2. FEELING DOWN, DEPRESSED, IRRITABLE, OR HOPELESS: NOT AT ALL

## 2018-07-30 ASSESSMENT — LIFESTYLE VARIABLES: EVER_SMOKED: NEVER

## 2018-07-30 NOTE — ED TRIAGE NOTES
"Ronit Regalado Britany  42 y.o. female  Chief Complaint   Patient presents with   • Headache     Pt. states severe bilateral temporal headache that is throbbing in nature. Pt. states these s/s happened a few days ago as well.   • Vomiting     Pt. states accompanying emesis with headache pain   • Knee Pain     Pt. states her left knee also hurts \"a lot.\" Pt. denies any trauma. no deformity noted to pt's left knee.      /87   Pulse 81   Temp 35.9 °C (96.7 °F)   Resp 16   Ht 1.651 m (5' 5\")   Wt 96.5 kg (212 lb 11.9 oz)   SpO2 100%   BMI 35.40 kg/m²     Pt. Is PERRLA upon triage assessment. A&O x 4 and able to ambulate with steady gait.  Pt amb to triage with steady gait for above complaint.   Pt is alert and oriented, speaking in full sentences, follows commands and responds appropriately to questions. NAD. Resp are even and unlabored.  Pt placed in lobby. Pt educated on triage process. Pt encouraged to alert staff for any changes.  "

## 2018-07-30 NOTE — ASSESSMENT & PLAN NOTE
Left facial droop  Symptoms resolved  Out of TPA window  CT head: negative  MRI, carotid ultrasound pending

## 2018-07-30 NOTE — ED NOTES
Med rec updated and complete  Allergies reviewed  Interviewed pt with  at bedside with permission from pt.  Pt reports no antibiotics in the last 30 days.  Pt reports no prescription medications or vitamins.  Pt reports that she gets her OMEPRAZOLE over the counter.

## 2018-07-30 NOTE — PROGRESS NOTES
Pt transferred to T201 via [facility] with ACLS RN.  Dr. Holden aware of arrival.

## 2018-07-30 NOTE — H&P
" Hospital Medicine History and Physical      Date of Service  7/30/2018    Chief Complaint  Chief Complaint   Patient presents with   • Headache     Pt. states severe bilateral temporal headache that is throbbing in nature. Pt. states these s/s happened a few days ago as well.   • Vomiting     Pt. states accompanying emesis with headache pain   • Knee Pain     Pt. states her left knee also hurts \"a lot.\" Pt. denies any trauma. no deformity noted to pt's left knee.        History of Presenting Illness  Britany is a 42 y.o. female PMH of psychiatric problems, who presents with headache and left facial droop starting midnight and lasted from 12am to 2am earlier today. She also complaint about left thigh numbness. Never had similar symptoms before. Denies any weakness over her extremities. She also complaint about slurred speech during that time. In the ER, neurology was consulted. CT head was negative. Currently her facial droop and slurred speech have resolved.    Primary Care Physician  Pcp Pt States None      Code Status  Full code    Review of Systems  Review of Systems   Constitutional: Negative for chills, fever and weight loss.   HENT: Negative for congestion and nosebleeds.    Eyes: Negative for blurred vision, pain, discharge and redness.   Respiratory: Negative for cough, sputum production, shortness of breath and stridor.    Cardiovascular: Negative for chest pain, palpitations and orthopnea.   Gastrointestinal: Negative for abdominal pain, diarrhea, heartburn, nausea and vomiting.   Genitourinary: Negative for dysuria, frequency and urgency.   Musculoskeletal: Negative for back pain, myalgias and neck pain.   Skin: Negative for itching and rash.   Neurological: Positive for headaches. Negative for dizziness, focal weakness and seizures.   Psychiatric/Behavioral: Negative for depression. The patient is not nervous/anxious and does not have insomnia.      Please see HPI, all other systems were reviewed " and are negative (AMA/CMS criteria)     Past Medical History  Past Medical History:   Diagnosis Date   • Psychiatric problem        Surgical History  Past Surgical History:   Procedure Laterality Date   • CERVICAL DISK AND FUSION ANTERIOR  5/10/2017    Procedure: CERVICAL DISK AND FUSION ANTERIOR C5-7;  Surgeon: Bossman Hoffman M.D.;  Location: SURGERY [facility];  Service:    • MASTOPEXY  2010    Performed by KRISTEN MURCIA at SURGERY [facility]   • MAMMOPLASTY AUGMENTATION     • OTHER  2006    BREAST AUGMENTATION   • TONSILLECTOMY  as child       Medications  No current facility-administered medications on file prior to encounter.      Current Outpatient Prescriptions on File Prior to Encounter   Medication Sig Dispense Refill   • acetaminophen (TYLENOL) 325 MG Tab Take 650 mg by mouth every 6 hours as needed for Moderate Pain.       Family History  Family History   Problem Relation Age of Onset   • Non-contributory Neg Hx          Social History  Social History   Substance Use Topics   • Smoking status: Never Smoker   • Smokeless tobacco: Never Used   • Alcohol use No       Allergies  Allergies   Allergen Reactions   • Nkda [No Known Drug Allergy]         Physical Exam  Laboratory   Hemodynamics  Temp (24hrs), Av.9 °C (96.7 °F), Min:35.9 °C (96.7 °F), Max:35.9 °C (96.7 °F)   Temperature: 35.9 °C (96.7 °F)  Pulse  Av.7  Min: 66  Max: 98    Blood Pressure: 125/87, NIBP: 117/55      Respiratory      Respiration: 16, Pulse Oximetry: 96 %             Physical Exam   Constitutional: She is oriented to person, place, and time. No distress.   HENT:   Head: Normocephalic and atraumatic.   Mouth/Throat: Oropharynx is clear and moist.   Eyes: Pupils are equal, round, and reactive to light. Conjunctivae and EOM are normal.   Neck: Normal range of motion. Neck supple. No tracheal deviation present. No thyromegaly present.   Cardiovascular: Normal rate and regular rhythm.    No murmur  heard.  Pulmonary/Chest: Effort normal and breath sounds normal. No respiratory distress. She has no wheezes.   Abdominal: Soft. Bowel sounds are normal. She exhibits no distension. There is no tenderness.   Musculoskeletal: She exhibits no edema or tenderness.   Neurological: She is alert and oriented to person, place, and time. No cranial nerve deficit.   Skin: Skin is warm and dry. She is not diaphoretic. No erythema.   Psychiatric: She has a normal mood and affect. Her behavior is normal. Thought content normal.       Recent Labs      07/30/18   0620   WBC  7.3   RBC  4.34   HEMOGLOBIN  13.7   HEMATOCRIT  41.7   MCV  96.1   MCH  31.6   MCHC  32.9*   RDW  47.7   PLATELETCT  334   MPV  10.0     Recent Labs      07/30/18 0620   SODIUM  140   POTASSIUM  3.9   CHLORIDE  108   CO2  25   GLUCOSE  109*   BUN  15   CREATININE  0.90   CALCIUM  9.1     Recent Labs      07/30/18   0620   ALTSGPT  16   ASTSGOT  16   ALKPHOSPHAT  74   TBILIRUBIN  0.3   GLUCOSE  109*     Recent Labs      07/30/18   0620   APTT  27.5   INR  0.96             Lab Results   Component Value Date    TROPONINI <0.01 05/11/2017       Imaging  CT-CTA HEAD WITH & W/O-POST PROCESS   Final Result      CT angiogram of the [place] within normal limits.      CT-CTA NECK WITH & W/O-POST PROCESSING   Final Result      CT angiogram of the neck within normal limits.      CT-HEAD W/O   Final Result      1. No evidence of acute cerebral infarction, hemorrhage or mass lesion.      DX-KNEE COMPLETE 4+ LEFT    (Results Pending)   MR-BRAIN-W/O    (Results Pending)   Carotid Duplex ([facility] Only) - Do not order if CTA - Neck done in ER    (Results Pending)     EKG  per my independant read:  QTc: 439, HR: 62, Normal Sinus Rhythm, no ST/T changes     Assessment/Plan     I anticipate this patient is appropriate for observation status at this time.    Hyperglycemia- (present on admission)   Assessment & Plan    No history of dm        Tension  type headache- (present on admission)   Assessment & Plan    Currently resolved        Facial droop- (present on admission)   Assessment & Plan    Left facial droop  Symptoms resolved  Out of TPA window  CT head: negative  MRI, carotid ultrasound pending            Prophylaxis:  lovenox

## 2018-07-30 NOTE — ED PROVIDER NOTES
"ED Provider Note    Scribed for Micah Schwarz D.O. by Cullen No. 7/30/2018  6:16 AM    Primary care provider: Pcp Pt States None  Means of arrival: walk in  History obtained from: patient  History limited by: none    CHIEF COMPLAINT  Chief Complaint   Patient presents with   • Headache     Pt. states severe bilateral temporal headache that is throbbing in nature. Pt. states these s/s happened a few days ago as well.   • Vomiting     Pt. states accompanying emesis with headache pain   • Knee Pain     Pt. states her left knee also hurts \"a lot.\" Pt. denies any trauma. no deformity noted to pt's left knee.        HPI  Ronit Garg is a 42 y.o. female who presents to the Emergency Department complaining of sudden headache starting 2 hours ago waking her from sleep. She describes her headache as constant and sharp localized in her forehead, and is 9+/10 severity. Patient reports associated photophobia, vomiting, generalized weakness, diaphoresis, dizziness. She has not administered any medications to improve her symptoms. She has a history of gastritis for which she takes Omeprazole. Patient denies vision changes, fever, chills, bleeding, drug use, history of headaches, history of kidney disease. Her last menstrual period was July 6th.     Patient is also complaining of sudden left knee pain starting 2 days ago. She localizes her pain ot the medial aspect of her left knee just inferior to patella. Patient states that Bengay administration and bandaging the extremity has not improved her pain. She denies trauma.     REVIEW OF SYSTEMS  Pertinent positives include headache, vomiting, photophobia, weakness, diaphoresis, knee pain, dizziness. Pertinent negatives include no vision changes, fever, chills, trauma, bleeding.  All other systems reviewed and negative.  C.    PAST MEDICAL HISTORY  Past Medical History:   Diagnosis Date   • Psychiatric problem      SURGICAL HISTORY  Past Surgical " "History:   Procedure Laterality Date   • CERVICAL DISK AND FUSION ANTERIOR  5/10/2017    Procedure: CERVICAL DISK AND FUSION ANTERIOR C5-7;  Surgeon: Bossman Hoffman M.D.;  Location: SURGERY [facility];  Service:    • MASTOPEXY  9/14/2010    Performed by KRISTEN MURCIA at SURGERY [facility]   • MAMMOPLASTY AUGMENTATION  2007   • OTHER  2006    BREAST AUGMENTATION   • TONSILLECTOMY  as child        SOCIAL HISTORY  Social History   Substance Use Topics   • Smoking status: Never Smoker   • Smokeless tobacco: Never Used   • Alcohol use No      History   Drug Use No       FAMILY HISTORY  Family History   Problem Relation Age of Onset   • Non-contributory Neg Hx        CURRENT MEDICATIONS  Current Outpatient Prescriptions:   •  docusate sodium 100 MG Cap, Take 100 mg by mouth 2 times a day., Disp: 60 Cap, Rfl:   •  methocarbamol (ROBAXIN) 750 MG Tab, Take 1 Tab by mouth 3 times a day as needed (spasms)., Disp: 60 Tab, Rfl: 0  •  hydrocodone-acetaminophen (NORCO) 5-325 MG Tab per tablet, Take 1-2 Tabs by mouth every 6 hours as needed., Disp: 80 Tab, Rfl: 0  •  acetaminophen (TYLENOL) 325 MG Tab, Take 650 mg by mouth Once., Disp: , Rfl:     ALLERGIES  Allergies   Allergen Reactions   • Nkda [No Known Drug Allergy]        PHYSICAL EXAM  VITAL SIGNS: /87   Pulse 81   Temp 35.9 °C (96.7 °F)   Resp 16   Ht 1.651 m (5' 5\")   Wt 96.5 kg (212 lb 11.9 oz)   SpO2 100%   BMI 35.40 kg/m²     Nursing notes and vitals reviewed.  Constitutional: Well developed, Well nourished, Mild to moderate distress, Non-toxic appearance.   Eyes: PERRLA, EOMI, Conjunctiva normal, No discharge.   Cardiovascular: Normal heart rate, Normal rhythm, No murmurs, No rubs, No gallops.   Thorax & Lungs: No respiratory distress, No rales, No rhonchi, No wheezing, No chest tenderness.   Abdomen: Bowel sounds normal, Soft, No tenderness, No guarding, No rebound, No masses, No pulsatile masses.   Skin: Warm, Dry, No erythema, No " rash.   Musculoskeletal: Intact distal pulses, No edema, No cyanosis, No clubbing. Good range of motion in all major joints. Slight tenderness in the medial aspect of the left knee no effusion, full range of motion left knee flexion and extension, negative medial, lateral collateral tenderness or pain with stress as well as anterior posterior cruciate abnormality, distal pulses are brisk left lower extremity, no hip tenderness  Neurologic: Alert & oriented to month and age, Normal cognition, Cranial nerves II-XII are intact, No slurred speech, Negative finger to nose bilaterally, No pronator drift bilaterally,   strength 5/5 bilaterally, Leg raise strength 5/5 bilaterally, Plantarflexion strength 5/5 bilaterally, Dorsiflexion strength 5/5 bilaterally, Deep tendon reflexes 2/4 upper and lower extremities bilaterally, No Nystagmus. Decreased nasolabial sensation on the right. Slight right-sided facial droop with decreased sensation to the right side of her face  Psychiatric: Affect normal for clinical presentation.    DIAGNOSTIC STUDIES/PROCEDURES    LABS  Results for orders placed or performed during the hospital encounter of 07/30/18   CBC WITH DIFFERENTIAL   Result Value Ref Range    WBC 7.3 4.8 - 10.8 K/uL    RBC 4.34 4.20 - 5.40 M/uL    Hemoglobin 13.7 12.0 - 16.0 g/dL    Hematocrit 41.7 37.0 - 47.0 %    MCV 96.1 81.4 - 97.8 fL    MCH 31.6 27.0 - 33.0 pg    MCHC 32.9 (L) 33.6 - 35.0 g/dL    RDW 47.7 35.9 - 50.0 fL    Platelet Count 334 164 - 446 K/uL    MPV 10.0 9.0 - 12.9 fL    Neutrophils-Polys 56.20 44.00 - 72.00 %    Lymphocytes 33.50 22.00 - 41.00 %    Monocytes 7.90 0.00 - 13.40 %    Eosinophils 1.40 0.00 - 6.90 %    Basophils 0.70 0.00 - 1.80 %    Immature Granulocytes 0.30 0.00 - 0.90 %    Nucleated RBC 0.00 /100 WBC    Neutrophils (Absolute) 4.09 2.00 - 7.15 K/uL    Lymphs (Absolute) 2.43 1.00 - 4.80 K/uL    Monos (Absolute) 0.57 0.00 - 0.85 K/uL    Eos (Absolute) 0.10 0.00 - 0.51 K/uL    Baso  (Absolute) 0.05 0.00 - 0.12 K/uL    Immature Granulocytes (abs) 0.02 0.00 - 0.11 K/uL    NRBC (Absolute) 0.00 K/uL   CMP   Result Value Ref Range    Sodium 140 135 - 145 mmol/L    Potassium 3.9 3.6 - 5.5 mmol/L    Chloride 108 96 - 112 mmol/L    Co2 25 20 - 33 mmol/L    Anion Gap 7.0 0.0 - 11.9    Glucose 109 (H) 65 - 99 mg/dL    Bun 15 8 - 22 mg/dL    Creatinine 0.90 0.50 - 1.40 mg/dL    Calcium 9.1 8.5 - 10.5 mg/dL    AST(SGOT) 16 12 - 45 U/L    ALT(SGPT) 16 2 - 50 U/L    Alkaline Phosphatase 74 30 - 99 U/L    Total Bilirubin 0.3 0.1 - 1.5 mg/dL    Albumin 4.3 3.2 - 4.9 g/dL    Total Protein 6.9 6.0 - 8.2 g/dL    Globulin 2.6 1.9 - 3.5 g/dL    A-G Ratio 1.7 g/dL   WESTERGREN SED RATE   Result Value Ref Range    Sed Rate Westergren 21 (H) 0 - 20 mm/hour   CRP QUANTITIVE (NON-CARDIAC)   Result Value Ref Range    Stat C-Reactive Protein 0.45 0.00 - 0.75 mg/dL   PTT   Result Value Ref Range    APTT 27.5 24.7 - 36.0 sec   PT/INR   Result Value Ref Range    PT 12.5 12.0 - 14.6 sec    INR 0.96 0.87 - 1.13   ESTIMATED GFR   Result Value Ref Range    GFR If African American >60 >60 mL/min/1.73 m 2    GFR If Non African American >60 >60 mL/min/1.73 m 2   EKG (ER)   Result Value Ref Range    Report       [facility] Emergency Dept.    Test Date:  2018  Pt Name:    ARGELIA CHAVARRIA         Department: ER  MRN:        [id number]                      Room:       RD 01  Gender:     Female                       Technician: [id number]  :        [id number]                   Requested By:BRUCE VERGARA  Order #:    [id number]                    Reading MD:    Measurements  Intervals                                Axis  Rate:       62                           P:          64  IL:         144                          QRS:        82  QRSD:       82                           T:          21  QT:         432  QTc:        439    Interpretive Statements  SINUS RHYTHM  Compared to ECG 2017 12:11:00  No  significant changes     All labs reviewed by me.    RADIOLOGY  MR-BRAIN-W/O   Final Result      MRI OF THE BRAIN WITHOUT CONTRAST WITHIN NORMAL LIMITS.      DX-KNEE COMPLETE 4+ LEFT   Final Result      No evidence of acute fracture or dislocation.      Mild degenerative changes.            CT-CTA HEAD WITH & W/O-POST PROCESS   Final Result      CT angiogram of the [place] within normal limits.      CT-CTA NECK WITH & W/O-POST PROCESSING   Final Result      CT angiogram of the neck within normal limits.      CT-HEAD W/O   Final Result      1. No evidence of acute cerebral infarction, hemorrhage or mass lesion.      The radiologist's interpretation of all radiological studies have been reviewed by me.    COURSE & MEDICAL DECISION MAKING  Pertinent Labs & Imaging studies reviewed. (See chart for details)    6:16 AM - Patient seen and examined at bedside by medical resident. Patient will be treated with Reglan 10 mg, Benadryl 25 mg. Ordered CT head, POC urinalysis, POC urine pregnancy, CBC with differential, CMP, Westergren SED rate, CRP quantitive to evaluate her symptoms.     7:00 AM - Patient reevaluated at bedside by me. She reports feeling slightly improved with medication administration, but still describes her headache as severe. Ordered for CT CTA neck, CT CTA head, DX knee, PTT, PT/INR. Paged neurology.     7:13 AM - I discussed the patient's case and the above findings with Dr. Delgado (neurology) who agrees to consult and states the patient should be admitted for further evaluation and management. Patient will be treated with Decadron 10 mg    7:55 AM - Patient reevaluated at bedside. Discussed admission to the hospital.     8:54 AM - Paged hospitalist.     9:02 AM - I discussed the patient's case and the above findings with Dr. Holden (hospitalist) who will see and admit the patient. Care is transferred at this time.    This is a charming 42 y.o. female that presents with headache, right-sided facial  droop as well as sensation change. CT without contrast was completed and was negative for intracranial hemorrhage. On reevaluation she continued to have right-sided facial droop sensation changes therefore the patient underwent a stat CTA head and neck. CTA are negative for acute aneurysm, or other significant abnormality. The patient did receive Reglan and Benadryl with slight improvement in his symptoms. Focal neurological deficits and do not suspect a subarachnoid hemorrhage, idiopathic intracranial hypertension, meningitis, encephalitis, subdural hematoma. The patient will be admitted to hospitalist for further evaluation and management secondary to vague deficits.    DISPOSITION:  Patient will be admitted to hospital in guarded condition.    FINAL IMPRESSION  Right facial droop  Headache    I independently evaluated the patient and repeated the important components of the history and physical. I discussed the management with the resident. I have reviewed and agree with the pertinent clinical information above including history, exam, study findings, and recommendations.      Cullen CLEMENTE (Scribe), am scribing for, and in the presence of, Micah Schwarz D.O    Electronically signed by: Cullen No (Scribe), 7/30/2018    Micah CLEMENTE D.O. personally performed the services described in this documentation, as scribed by Cullen No in my presence, and it is both accurate and complete.    The note accurately reflects work and decisions made by me.  Micah Schwarz  7/30/2018  2:09 PM

## 2018-07-30 NOTE — PROGRESS NOTES
Pt discharged to home. IV d/c'd, pt armband removed. Pt and family understand written and verbal d/c instructions.  Prescription sent electronically to pt verified pharmacy, pt to also  OTC prilosec.  Regular diet, activity as tolerated.  Pt to follow up with PCP ASAP.  Pt verbalized understanding.

## 2018-07-30 NOTE — DISCHARGE PLANNING
Pt provided with [place] [facility] coupon, Meclizine tablets are $12.90 at [facility], other medications are OTC.

## 2018-07-30 NOTE — DISCHARGE INSTRUCTIONS
Discharge Instructions    Discharged to home by car with relative. Discharged via wheelchair, hospital escort: Yes.  Special equipment needed: Not Applicable    Be sure to schedule a follow-up appointment with your primary care doctor or any specialists as instructed.     Discharge Plan:   Diet Plan: Discussed (Regular)  Activity Level: Discussed (As tolerated)  Confirmed Follow up Appointment: Patient to Call and Schedule Appointment  Confirmed Symptoms Management: Discussed  Medication Reconciliation Updated: Yes  Influenza Vaccine Indication: Patient Refuses    I understand that a diet low in cholesterol, fat, and sodium is recommended for good health. Unless I have been given specific instructions below for another diet, I accept this instruction as my diet prescription.   Other diet: Regular    Special Instructions: None    · Is patient discharged on Warfarin / Coumadin?   No     Depression / Suicide Risk    As you are discharged from this [facility] facility, it is important to learn how to keep safe from harming yourself.    Recognize the warning signs:  · Abrupt changes in personality, positive or negative- including increase in energy   · Giving away possessions  · Change in eating patterns- significant weight changes-  positive or negative  · Change in sleeping patterns- unable to sleep or sleeping all the time   · Unwillingness or inability to communicate  · Depression  · Unusual sadness, discouragement and loneliness  · Talk of wanting to die  · Neglect of personal appearance   · Rebelliousness- reckless behavior  · Withdrawal from people/activities they love  · Confusion- inability to concentrate     If you or a loved one observes any of these behaviors or has concerns about self-harm, here's what you can do:  · Talk about it- your feelings and reasons for harming yourself  · Remove any means that you might use to hurt yourself (examples: pills, rope, extension cords, firearm)  · Get professional  help from the community (Mental Health, Substance Abuse, psychological counseling)  · Do not be alone:Call your Safe Contact- someone whom you trust who will be there for you.  · Call your local CRISIS HOTLINE 219-9540 or 921-578-1977  · Call your local Children's Mobile Crisis Response Team [place] (504) 163-6743 or www.[employer]  · Call the toll free National Suicide Prevention Hotlines   · National Suicide Prevention Lifeline 762-757-DRUZ (3175)  · National Hope Line Network 800-SUICIDE (695-5415)

## 2018-07-30 NOTE — DISCHARGE SUMMARY
"Discharge Summary    CHIEF COMPLAINT ON ADMISSION  Chief Complaint   Patient presents with   • Headache     Pt. states severe bilateral temporal headache that is throbbing in nature. Pt. states these s/s happened a few days ago as well.   • Vomiting     Pt. states accompanying emesis with headache pain   • Knee Pain     Pt. states her left knee also hurts \"a lot.\" Pt. denies any trauma. no deformity noted to pt's left knee.        Reason for Admission  Vomiting, headache     Admission Date  7/30/2018    CODE STATUS  Full Code    HPI & HOSPITAL COURSE  This is a 42 y.o. female here with headache and left facial droop earlier today.   Please see H&P for details. Her symptoms have resolved. Neurology was consulted.  CT and MRI brain were negative. She was evaluated by PT/OT.  She was noticed to have nystagmus by OT, but no vertigo per patient.  Discharged home with oral meclizine.  Instructed to come back to ER if her symptoms get worse.       Therefore, she is discharged in fair and stable condition to home with close outpatient follow-up.        Discharge Date  7/30/18    FOLLOW UP ITEMS POST DISCHARGE      DISCHARGE DIAGNOSES  Active Problems:    Facial droop POA: Yes    Tension type headache POA: Yes    Hyperglycemia POA: Yes  Resolved Problems:    * No resolved hospital problems. *      FOLLOW UP  No future appointments.  PCP in 1 week            MEDICATIONS ON DISCHARGE     Medication List      START taking these medications      Instructions   meclizine 25 MG Tabs  Commonly known as:  ANTIVERT   Take 1 Tab by mouth 3 times a day as needed for Dizziness or Vertigo.  Dose:  25 mg        CONTINUE taking these medications      Instructions   acetaminophen 325 MG Tabs  Commonly known as:  TYLENOL   Take 650 mg by mouth every 6 hours as needed for Moderate Pain.  Dose:  650 mg     omeprazole 20 MG delayed-release capsule  Commonly known as:  PRILOSEC   Take 20 mg by mouth every evening.  Dose:  20 mg      "       Allergies  Allergies   Allergen Reactions   • Nkda [No Known Drug Allergy]        DIET  Orders Placed This Encounter   Procedures   • Diet Order Regular     Standing Status:   Standing     Number of Occurrences:   1     Order Specific Question:   Diet:     Answer:   Regular [1]       ACTIVITY  As tolerated.  Weight bearing as tolerated    CONSULTATIONS  neurology    PROCEDURES      LABORATORY  Lab Results   Component Value Date    SODIUM 140 07/30/2018    POTASSIUM 3.9 07/30/2018    CHLORIDE 108 07/30/2018    CO2 25 07/30/2018    GLUCOSE 109 (H) 07/30/2018    BUN 15 07/30/2018    CREATININE 0.90 07/30/2018        Lab Results   Component Value Date    WBC 7.3 07/30/2018    HEMOGLOBIN 13.7 07/30/2018    HEMATOCRIT 41.7 07/30/2018    PLATELETCT 334 07/30/2018        Total time of the discharge process exceeds 56 minutes.

## 2018-07-30 NOTE — THERAPY
"Physical Therapy Evaluation completed.   Bed Mobility:  Supine to Sit: Supervised  Transfers: Sit to Stand: Supervised  Gait: Level Of Assist: Supervised with No Equipment Needed       Plan of Care: Patient with no further skilled PT needs in the acute care setting at this time  Discharge Recommendations: Equipment: No Equipment Needed. Post-acute therapy Currently anticipate no further skilled therapy needs once patient is discharged from the inpatient setting.    Pt is a 42 year old female admitted to the hospital for Headache, vomiting, knee pain, L thigh numbness and facial droop. Pt reports that majority of symptoms have resolved with the exception of L knee pain, blurry vision and feeling that \"my eyes are dancing.\" Pt reports that she was active and independent prior to admit. Pt did have mild L LE weakness compared to R, luli proximally at the knee and hip, 4/5 strength. Pt states this is due to pain. Assessed pt's visual tracking and head rotation. Pt reports difficulty focusing with visual tracking but no nystagmus noted. Pt did have slow R beating nystagmus with head turns as well as increased dizziness. Completed [place] Hallpike to assess for BPPV. Pt had no nystagmus to either direction with [place] Hallpike and reported no change in her dizziness. At time of initial evaluation, pt performed all mobility tasks at SPV level. Pt ambulated around unit without use of AD with SPV. Pt had no overt LOB or gait deviations present. Pt reported feeling dizzy with ambulation, however, gait steady. At this time, pt does not demonstrate the need for additional PT intervention while in the acute care setting. Pt is clear for DC by PT with no post acute equipment needs identified.     See \"Rehab Therapy-Acute\" Patient Summary Report for complete documentation.     "

## 2018-07-30 NOTE — PROGRESS NOTES
Report received previously from ER RN, assumed patient care.  Pt A&OX4.  Family at bedside.  Assessment completed.  Call light within reach, personal belongings available, bed in lowest position, pt calling for assistance.  Pt reports no pain.  -n/t.  Passed dysphagia screening.  Communication board updated, POC discussed.  Monitors applied, VSS.  No additional needs at this time.

## 2018-07-30 NOTE — DISCHARGE PLANNING
Care Transition Team Assessment    Met with pt at bedside. According to pt she lives with her  and two children aged 14 yrs and 2 yrs old. Independent at baseline, does not use assistive devices. Pt said that she attends [employer] [facility] [employer] and is charged $10, pt does not have prescription coverage. Telephoned Patient Financial Assistance and spoke with Marsha who said that pt will be seen, most likely tomorrow. Pt said her  will provide transport at discharge.    Information Source  Orientation : Oriented x 4  Information Given By: Patient    Readmission Evaluation  Is this a readmission?: No    Interdisciplinary Discharge Planning  Does Admitting Nurse Feel This Could be a Complex Discharge?: No  Primary Care Physician: Attends [employer] [facility]  Lives with - Patient's Self Care Capacity: Alone and Able to Care For Self, Child Less than 18 Years of Age  Patient or legal guardian wants to designate a caregiver (see row info): No  Support Systems: Children, Spouse / Significant Other  Housing / Facility: [address]  Do You Take your Prescribed Medications Regularly: Yes  Able to Return to Previous ADL's: Yes  Mobility Issues: No  Prior Services: None  Patient Expects to be Discharged to:: Home  Assistance Needed: No  Durable Medical Equipment: Not Applicable    Discharge Preparedness  What are your discharge supports?: Spouse  Prior Functional Level: Independent with Activities of Daily Living, Independent with Medication Management, Ambulatory  Difficulity with ADLs: None  Difficulity with IADLs: None    Functional Assesment  Prior Functional Level: Independent with Activities of Daily Living, Independent with Medication Management, Ambulatory    Finances  Prescription Coverage: No  Prescription Coverage Comments:    Discharge Risks or Barriers  Discharge risks or barriers?: Uninsured / underinsured  Patient risk factors: Uninsured or underinsured    Anticipated Discharge Information  Anticipated discharge  disposition: Home  Discharge Address: [address] [place] [address]  Discharge Contact Phone Number: Taryn rajan [phone]

## 2018-07-30 NOTE — THERAPY
"Occupational Therapy Evaluation completed.   Functional Status:  Pt currently able to perform basic ADL's & functional mobility with supervision & no AE.  Pt has equal strength BUE & no co-ord deficits noted.  Pt did report feeling a bit dizzy in sitting & standing but had no LOB.  Pt reports her  & 14 yr daughter are able to assist her if needed upon D/C.  Plan of Care: Patient with no further skilled OT needs in the acute care setting at this time  Discharge Recommendations:  Equipment: No Equipment Needed. Post-acute therapy Currently anticipate no further skilled therapy needs once patient is discharged from the inpatient setting.    See \"Rehab Therapy-Acute\" Patient Summary Report for complete documentation.    "

## 2018-08-01 LAB
EST. AVERAGE GLUCOSE BLD GHB EST-MCNC: 103 MG/DL
HBA1C MFR BLD: 5.2 % (ref 0–5.6)

## 2018-09-07 ENCOUNTER — HOSPITAL ENCOUNTER (OUTPATIENT)
Dept: RADIOLOGY | Facility: MEDICAL CENTER | Age: 43
End: 2018-09-07

## 2018-09-07 DIAGNOSIS — M79.605 LEFT LEG PAIN: ICD-10-CM

## 2018-11-13 ENCOUNTER — HOSPITAL ENCOUNTER (OUTPATIENT)
Dept: RADIOLOGY | Facility: MEDICAL CENTER | Age: 43
End: 2018-11-13
Attending: PHYSICIAN ASSISTANT

## 2018-11-13 DIAGNOSIS — R10.2 PELVIC PAIN: ICD-10-CM

## 2019-05-21 ENCOUNTER — HOSPITAL ENCOUNTER (OUTPATIENT)
Dept: RADIOLOGY | Facility: MEDICAL CENTER | Age: 44
End: 2019-05-21
Attending: NURSE PRACTITIONER

## 2019-05-21 ENCOUNTER — HOSPITAL ENCOUNTER (OUTPATIENT)
Dept: RADIOLOGY | Facility: MEDICAL CENTER | Age: 44
End: 2019-05-21
Attending: PHYSICIAN ASSISTANT

## 2019-05-21 DIAGNOSIS — R10.2 PELVIC PAIN IN FEMALE: ICD-10-CM

## 2019-05-21 DIAGNOSIS — N30.90 CYSTITIS: ICD-10-CM

## 2020-10-29 ENCOUNTER — HOSPITAL ENCOUNTER (OUTPATIENT)
Dept: RADIOLOGY | Facility: MEDICAL CENTER | Age: 45
End: 2020-10-29
Attending: PHYSICIAN ASSISTANT

## 2020-10-29 DIAGNOSIS — Z30.431 IUD CHECK UP: ICD-10-CM

## 2022-05-27 ENCOUNTER — HOSPITAL ENCOUNTER (OUTPATIENT)
Dept: RADIOLOGY | Facility: MEDICAL CENTER | Age: 47
End: 2022-05-27
Attending: PHYSICIAN ASSISTANT

## 2022-05-27 DIAGNOSIS — R10.13 EPIGASTRIC PAIN: ICD-10-CM

## 2023-11-30 ENCOUNTER — HOSPITAL ENCOUNTER (OUTPATIENT)
Dept: RADIOLOGY | Facility: MEDICAL CENTER | Age: 48
End: 2023-11-30
Attending: NURSE PRACTITIONER
Payer: COMMERCIAL

## 2023-11-30 DIAGNOSIS — R30.0 DYSURIA: ICD-10-CM

## (undated) DEVICE — SHEET THYROID - (10EA/CA)

## (undated) DEVICE — BOVIE BLADE COATED &INSULATED - 25/PK

## (undated) DEVICE — KIT EVACUATER 3 SPRING PVC LF 1/8 DRAIN SIZE (10EA/CA)"

## (undated) DEVICE — SUTURE 4-0 MONOCRYL PLUS PS-2 - 27 INCH (36/BX)

## (undated) DEVICE — SODIUM CHL IRRIGATION 0.9% 1000ML (12EA/CA)

## (undated) DEVICE — DRAPE LARGE 3 QUARTER - (20/CA)

## (undated) DEVICE — GLOVE BIOGEL SZ 8 SURGICAL PF LTX - (50PR/BX 4BX/CA)

## (undated) DEVICE — GLOVE BIOGEL SZ 6.5 SURGICAL PF LTX (50PR/BX 4BX/CA)

## (undated) DEVICE — SUCTION INSTRUMENT YANKAUER BULBOUS TIP W/O VENT (50EA/CA)

## (undated) DEVICE — COLLAR CERVICAL 4 IN

## (undated) DEVICE — LACTATED RINGERS INJ. 500 ML - (24EA/CA)

## (undated) DEVICE — APPLICATOR COTTON TIP 6 IN - STERILE (2EA/PK 100PK/BX)

## (undated) DEVICE — NEEDLE NON SAFETY HYPO 22 GA X 1 1/2 IN (100/BX)

## (undated) DEVICE — GLOVE BIOGEL PI INDICATOR SZ 7.0 SURGICAL PF LF - (50/BX 4BX/CA)

## (undated) DEVICE — SUTURE GENERAL

## (undated) DEVICE — RESTRAINTS LIMB DISP. - (12/BX 4BX/CA)

## (undated) DEVICE — SPONGE PEANUT - (5/PK 50PK/CA)

## (undated) DEVICE — MASK ANESTHESIA ADULT  - (100/CA)

## (undated) DEVICE — DRAPE 36X28IN RAD CARM BND BG - (25/CA) O

## (undated) DEVICE — CHLORAPREP 26 ML APPLICATOR - ORANGE TINT(25/CA)

## (undated) DEVICE — SYRINGE ASEPTO - (50EA/CA

## (undated) DEVICE — GLOVE BIOGEL INDICATOR SZ 6.5 SURGICAL PF LTX - (50PR/BX 4BX/CA)

## (undated) DEVICE — KIT ANESTHESIA W/CIRCUIT & 3/LT BAG W/FILTER (20EA/CA)

## (undated) DEVICE — SET EXTENSION WITH 2 PORTS (48EA/CA) ***PART #2C8610 IS A SUBSTITUTE*****

## (undated) DEVICE — TOOL DISSECT MATCH HEAD

## (undated) DEVICE — SENSOR SPO2 NEO LNCS ADHESIVE (20/BX) SEE USER NOTES

## (undated) DEVICE — MIDAS LUBRICATOR DIFFUSER PACK (4EA/CA)

## (undated) DEVICE — GLOVE BIOGEL INDICATOR SZ 8 SURGICAL PF LTX - (50/BX 4BX/CA)

## (undated) DEVICE — NEEDLE SPINAL NON-SAFETY 18 GA X 3 IN (25EA/BX)

## (undated) DEVICE — DRESSING TRANSPARENT FILM TEGADERM 4 X 4.75" (50EA/BX)"

## (undated) DEVICE — KIT SURGIFLO W/OUT THROMBIN - (6EA/CA)

## (undated) DEVICE — SYRINGE SAFETY 3 ML 18 GA X 1 1/2 BLUNT LL (100/BX 8BX/CA)

## (undated) DEVICE — TUBING CLEARLINK DUO-VENT - C-FLO (48EA/CA)

## (undated) DEVICE — SUTURE 3-0 VICRYL PLUS SH - 8X 18 INCH (12/BX)

## (undated) DEVICE — GOWN SURGICAL XX-LARGE - (28EA/CA) SIRUS NON REINFORCED

## (undated) DEVICE — NEPTUNE 4 PORT MANIFOLD - (20/PK)

## (undated) DEVICE — DRAPE MICROSCOPE X-LONG (10EA/CA)

## (undated) DEVICE — SYRINGE SAFETY 10 ML 18 GA X 1 1/2 BLUNT LL (100/BX 4BX/CA)

## (undated) DEVICE — BLADE SURGICAL #15 - (50/BX 3BX/CA)

## (undated) DEVICE — LEAD SET 6 DISP. EKG NIHON KOHDEN

## (undated) DEVICE — HEAD HOLDER JUNIOR/ADULT

## (undated) DEVICE — GOWN WARMING STANDARD FLEX - (30/CA)

## (undated) DEVICE — DERMABOND ADVANCED - (12EA/BX)

## (undated) DEVICE — SCREW DISTRACTION 14MM YELLOW - STERILE (10EA/BX) (5TX4=20)

## (undated) DEVICE — KIT ROOM DECONTAMINATION

## (undated) DEVICE — PROTECTOR ULNA NERVE - (36PR/CA)

## (undated) DEVICE — ELECTRODE DUAL RETURN W/ CORD - (50/PK)

## (undated) DEVICE — GLOVE BIOGEL PI ORTHO SZ 6 1/2 SURGICAL PF LF (40PR/BX)

## (undated) DEVICE — SPONGE GAUZESTER. 2X2 4-PL - (2/PK 50PK/BX 30BX/CS)

## (undated) DEVICE — GLOVE BIOGEL SZ 8.5 SURGICAL PF LTX - (50PR/BX 4BX/CA)

## (undated) DEVICE — TUBING C&T SET FLYING LEADS DRAIN TUBING (10EA/BX)

## (undated) DEVICE — SET LEADWIRE 5 LEAD BEDSIDE DISPOSABLE ECG (1SET OF 5/EA)

## (undated) DEVICE — PACK NEURO - (2EA/CA)

## (undated) DEVICE — CANISTER SUCTION 3000ML MECHANICAL FILTER AUTO SHUTOFF MEDI-VAC NONSTERILE LF DISP  (40EA/CA)

## (undated) DEVICE — ELECTRODE 850 FOAM ADHESIVE - HYDROGEL RADIOTRNSPRNT (50/PK)

## (undated) DEVICE — SLEEVE, VASO, THIGH, MED

## (undated) DEVICE — LACTATED RINGERS INJ 1000 ML - (14EA/CA 60CA/PF)